# Patient Record
Sex: MALE | Race: WHITE | NOT HISPANIC OR LATINO
[De-identification: names, ages, dates, MRNs, and addresses within clinical notes are randomized per-mention and may not be internally consistent; named-entity substitution may affect disease eponyms.]

---

## 2019-05-24 PROBLEM — Z00.00 ENCOUNTER FOR PREVENTIVE HEALTH EXAMINATION: Status: ACTIVE | Noted: 2019-05-24

## 2019-05-28 ENCOUNTER — APPOINTMENT (OUTPATIENT)
Dept: PULMONOLOGY | Facility: CLINIC | Age: 64
End: 2019-05-28
Payer: COMMERCIAL

## 2019-05-28 VITALS
DIASTOLIC BLOOD PRESSURE: 60 MMHG | OXYGEN SATURATION: 89 % | HEART RATE: 95 BPM | HEIGHT: 67 IN | SYSTOLIC BLOOD PRESSURE: 130 MMHG | BODY MASS INDEX: 48.97 KG/M2 | WEIGHT: 312 LBS

## 2019-05-28 PROCEDURE — 99205 OFFICE O/P NEW HI 60 MIN: CPT | Mod: 25

## 2019-05-28 PROCEDURE — 94729 DIFFUSING CAPACITY: CPT

## 2019-05-28 PROCEDURE — 85018 HEMOGLOBIN: CPT | Mod: QW

## 2019-05-28 PROCEDURE — 94060 EVALUATION OF WHEEZING: CPT

## 2019-05-28 PROCEDURE — 94727 GAS DIL/WSHOT DETER LNG VOL: CPT

## 2019-05-28 PROCEDURE — 94664 DEMO&/EVAL PT USE INHALER: CPT | Mod: 59

## 2019-05-28 RX ORDER — ALBUTEROL SULFATE 90 UG/1
108 (90 BASE) AEROSOL, METERED RESPIRATORY (INHALATION)
Refills: 0 | Status: ACTIVE | COMMUNITY

## 2019-05-28 NOTE — HISTORY OF PRESENT ILLNESS
[Frequent Nocturnal Awakening] : frequent nocturnal awakening [Snoring] : snoring [Daytime Somnolence] : daytime somnolence [Awakes Unrefreshed] : awakening unrefreshed [FreeTextEntry1] : Since Dec frequent episodes of nasal congestion, cough, wheeze. Would resolve for a couple of weeks and come back. Then on 5/10 developed chest congestion, sob. Went to urgent care. CXR done. Dx with pna; given nebulizer which helped; Rx levaquin, ventolin. Using ventolin TID. \par \par Still with cough and mucous. Some LAGUNAS, tightness although better. \par \par Acute on chronic B/L LE edema as well. \par \par Dx with asthma as child. Never hospitalized. History of frequent bronchitis episodes. Often given prednisone, advair to use temporarily. \par \par Last year had echo and stress test with cardiologist in [place]. Nothing abnormal found.

## 2019-05-28 NOTE — PHYSICAL EXAM
[Normal Conjunctiva] : the conjunctiva exhibited no abnormalities [General Appearance - In No Acute Distress] : no acute distress [Normal Oropharynx] : abnormal oropharynx [Low Lying Soft Palate] : low lying soft palate [Elongated Uvula] : elongated uvula [Enlarged Base of the Tongue] : enlargement of the base of the tongue [III] : III [Neck Appearance] : the appearance of the neck was normal [] : the neck was supple [Neck Circumference: ___] : neck circumference is [unfilled] [Heart Rate And Rhythm] : heart rate and rhythm were normal [Heart Sounds] : normal S1 and S2 [Normal Rate] : the respiratory rate was normal [Rate ___] : at [unfilled] breaths per minute [Decreased Breath Sounds Bilaterally Bases] : breath sounds were diminished over both bases [Bowel Sounds] : normal bowel sounds [Abdomen Soft] : soft [FreeTextEntry1] : obese [Nail Clubbing] : no clubbing of the fingernails [Cyanosis, Localized] : no localized cyanosis [No Focal Deficits] : no focal deficits [Oriented To Time, Place, And Person] : oriented to person, place, and time [Affect] : the affect was normal [Impaired Insight] : insight and judgment were intact

## 2019-05-28 NOTE — PROCEDURE
[FreeTextEntry1] : cxr done at urgent center 5/10/19 shows indistinct heart border on left\par \par PFT: obstruction in the severe range FEV1 37%; no BD response; lung volumes normal; dlco normal\par \par exercise oxymetry: O2 saturation on RA with exertion dropped to 88%; with 2 LPM pulsed dose increased to 92%

## 2019-05-28 NOTE — ASSESSMENT
[FreeTextEntry1] : Severe obstructive lung dz likely asthma with remodeling. Changes on CXR that could represent infection. Morbid obesity. Hypoxia likely result of all this but given his obesity, LE edema, decreased activity due to acute illness, need to R/O PE. Likely GIOVANNI.

## 2019-06-27 ENCOUNTER — APPOINTMENT (OUTPATIENT)
Dept: PULMONOLOGY | Facility: CLINIC | Age: 64
End: 2019-06-27
Payer: COMMERCIAL

## 2019-06-27 VITALS — SYSTOLIC BLOOD PRESSURE: 130 MMHG | DIASTOLIC BLOOD PRESSURE: 80 MMHG

## 2019-06-27 VITALS — HEART RATE: 77 BPM | OXYGEN SATURATION: 92 %

## 2019-06-27 VITALS — WEIGHT: 299 LBS | BODY MASS INDEX: 46.83 KG/M2

## 2019-06-27 DIAGNOSIS — Z09 ENCOUNTER FOR FOLLOW-UP EXAMINATION AFTER COMPLETED TREATMENT FOR CONDITIONS OTHER THAN MALIGNANT NEOPLASM: ICD-10-CM

## 2019-06-27 PROCEDURE — 94010 BREATHING CAPACITY TEST: CPT

## 2019-06-27 PROCEDURE — 99215 OFFICE O/P EST HI 40 MIN: CPT | Mod: 25

## 2019-06-27 RX ORDER — PREDNISONE 10 MG/1
10 TABLET ORAL
Qty: 21 | Refills: 0 | Status: DISCONTINUED | COMMUNITY
Start: 2019-05-28 | End: 2019-06-27

## 2019-10-24 ENCOUNTER — APPOINTMENT (OUTPATIENT)
Dept: PULMONOLOGY | Facility: CLINIC | Age: 64
End: 2019-10-24

## 2020-06-11 ENCOUNTER — RX RENEWAL (OUTPATIENT)
Age: 65
End: 2020-06-11

## 2020-07-16 ENCOUNTER — RX CHANGE (OUTPATIENT)
Age: 65
End: 2020-07-16

## 2020-07-21 ENCOUNTER — RX RENEWAL (OUTPATIENT)
Age: 65
End: 2020-07-21

## 2020-08-24 ENCOUNTER — RX CHANGE (OUTPATIENT)
Age: 65
End: 2020-08-24

## 2020-08-26 ENCOUNTER — RX CHANGE (OUTPATIENT)
Age: 65
End: 2020-08-26

## 2020-10-27 DIAGNOSIS — Z23 ENCOUNTER FOR IMMUNIZATION: ICD-10-CM

## 2020-12-23 ENCOUNTER — APPOINTMENT (OUTPATIENT)
Dept: PULMONOLOGY | Facility: CLINIC | Age: 65
End: 2020-12-23
Payer: COMMERCIAL

## 2020-12-23 PROCEDURE — 99213 OFFICE O/P EST LOW 20 MIN: CPT | Mod: 95

## 2020-12-23 NOTE — HISTORY OF PRESENT ILLNESS
[Home] : at home, [unfilled] , at the time of the visit. [Medical Office: ([facility])___] : at the medical office located in  [TextBox_4] : 65M PMH mobid obesity, asthma, GIOVANNI who presents for [facility] telehealth visit for COVID-19. He tested positive on Sunday with a rapid swab. Symptoms include scratchy sore throat, which is almost resolved. No SOB. No chest pain. Oxygen ranges from 88-93. He has home oxygen prescribed by Dr. Olmstead, he has not used it yet. He has asthma and never diagnosed with COPD. No fevers. No chills. No N/V/D. No cigarette use. 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 Wife and son both have. No swelling in legs.

## 2022-03-20 ENCOUNTER — TRANSCRIPTION ENCOUNTER (OUTPATIENT)
Age: 67
End: 2022-03-20

## 2022-10-19 ENCOUNTER — APPOINTMENT (OUTPATIENT)
Dept: PULMONOLOGY | Facility: CLINIC | Age: 67
End: 2022-10-19

## 2022-10-19 VITALS
OXYGEN SATURATION: 94 % | RESPIRATION RATE: 16 BRPM | SYSTOLIC BLOOD PRESSURE: 122 MMHG | WEIGHT: 305 LBS | HEIGHT: 68 IN | BODY MASS INDEX: 46.23 KG/M2 | HEART RATE: 78 BPM | DIASTOLIC BLOOD PRESSURE: 78 MMHG

## 2022-10-19 DIAGNOSIS — R06.2 WHEEZING: ICD-10-CM

## 2022-10-19 DIAGNOSIS — R05.9 COUGH, UNSPECIFIED: ICD-10-CM

## 2022-10-19 DIAGNOSIS — U07.1 COVID-19: ICD-10-CM

## 2022-10-19 DIAGNOSIS — G47.33 OBSTRUCTIVE SLEEP APNEA (ADULT) (PEDIATRIC): ICD-10-CM

## 2022-10-19 PROCEDURE — 99214 OFFICE O/P EST MOD 30 MIN: CPT | Mod: CS

## 2022-10-19 RX ORDER — DEXAMETHASONE 6 MG/1
6 TABLET ORAL DAILY
Qty: 10 | Refills: 0 | Status: DISCONTINUED | COMMUNITY
Start: 2020-12-23 | End: 2022-10-19

## 2022-10-25 ENCOUNTER — OUTPATIENT (OUTPATIENT)
Dept: OUTPATIENT SERVICES | Facility: HOSPITAL | Age: 67
LOS: 1 days | End: 2022-10-25
Payer: COMMERCIAL

## 2022-10-25 DIAGNOSIS — G47.33 OBSTRUCTIVE SLEEP APNEA (ADULT) (PEDIATRIC): ICD-10-CM

## 2022-10-25 PROCEDURE — 95800 SLP STDY UNATTENDED: CPT

## 2023-02-16 ENCOUNTER — APPOINTMENT (OUTPATIENT)
Dept: PULMONOLOGY | Facility: CLINIC | Age: 68
End: 2023-02-16

## 2023-02-21 ENCOUNTER — NON-APPOINTMENT (OUTPATIENT)
Age: 68
End: 2023-02-21

## 2023-05-19 ENCOUNTER — EMERGENCY (EMERGENCY)
Facility: HOSPITAL | Age: 68
LOS: 1 days | Discharge: HOME | End: 2023-05-19
Attending: EMERGENCY MEDICINE
Payer: MEDICARE

## 2023-05-19 VITALS
HEIGHT: 68 IN | TEMPERATURE: 98 F | HEART RATE: 130 BPM | RESPIRATION RATE: 20 BRPM | OXYGEN SATURATION: 94 % | WEIGHT: 304.9 LBS | SYSTOLIC BLOOD PRESSURE: 121 MMHG | DIASTOLIC BLOOD PRESSURE: 70 MMHG

## 2023-05-19 VITALS — HEART RATE: 92 BPM

## 2023-05-19 PROCEDURE — 99283 EMERGENCY DEPT VISIT LOW MDM: CPT | Mod: 25

## 2023-05-19 PROCEDURE — 99284 EMERGENCY DEPT VISIT MOD MDM: CPT | Mod: FS,25

## 2023-05-19 PROCEDURE — 90715 TDAP VACCINE 7 YRS/> IM: CPT

## 2023-05-19 PROCEDURE — 12001 RPR S/N/AX/GEN/TRNK 2.5CM/<: CPT

## 2023-05-19 PROCEDURE — 90471 IMMUNIZATION ADMIN: CPT

## 2023-05-19 PROCEDURE — 73130 X-RAY EXAM OF HAND: CPT | Mod: 26,LT

## 2023-05-19 PROCEDURE — 73130 X-RAY EXAM OF HAND: CPT

## 2023-05-19 RX ORDER — TETANUS TOXOID, REDUCED DIPHTHERIA TOXOID AND ACELLULAR PERTUSSIS VACCINE, ADSORBED 5; 2.5; 8; 8; 2.5 [IU]/.5ML; [IU]/.5ML; UG/.5ML; UG/.5ML; UG/.5ML
0.5 SUSPENSION INTRAMUSCULAR ONCE
Refills: 0 | Status: COMPLETED | OUTPATIENT
Start: 2023-05-19 | End: 2023-05-19

## 2023-05-19 RX ORDER — LIDOCAINE HCL 20 MG/ML
5 VIAL (ML) INJECTION ONCE
Refills: 0 | Status: COMPLETED | OUTPATIENT
Start: 2023-05-19 | End: 2023-05-19

## 2023-05-19 RX ADMIN — TETANUS TOXOID, REDUCED DIPHTHERIA TOXOID AND ACELLULAR PERTUSSIS VACCINE, ADSORBED 0.5 MILLILITER(S): 5; 2.5; 8; 8; 2.5 SUSPENSION INTRAMUSCULAR at 14:13

## 2023-05-19 RX ADMIN — Medication 5 MILLILITER(S): at 14:55

## 2023-05-19 RX ADMIN — Medication 1 TABLET(S): at 14:13

## 2023-05-19 NOTE — ED PROVIDER NOTE - ATTENDING APP SHARED VISIT CONTRIBUTION OF CARE
Juancho THAKKAROD-25-ck[age]old male with history of melanoma excision from the left cheek recently presents with laceration of the left index finger and a crush injury of the nail when he was using the drill at home.  Patient is right-handed.  Last tetanus is unknown.  No other injuries    Patient is alert well-appearing male, S1-S2 is normal regular, bilateral clear breath sounds, abdomen is soft nontender, neuro exam alert oriented x3, no focal deficits, left index finger has a laceration on the nail and medial aspect of the finger on the dorsal aspect but finger pulp is intact patient has no tenderness of the DIP joint and is able to flex and extend the finger.  There was mild bleeding that is already controlled with pressure.    Plan to do an x-ray of the left hand to rule out any fracture will supplement tetanus and repair the skin portion of the finger and will leave the nail to grow by secondary intention and cover it with Augmentin with hand follow-up.

## 2023-05-19 NOTE — ED PROVIDER NOTE - OBJECTIVE STATEMENT
68M with pmh asthma presenting with left 2nd digit injury. Pt was using suwzall and it accidentally crushed the finger causing break in the nail.   Last tetanus vaccine unknown

## 2023-05-19 NOTE — ED PROVIDER NOTE - CARE PROVIDER_API CALL
Emery Ferrell)  Orthopaedic Surgery; Surgery of the Hand  [address] [place] [address]  Phone: [phone]  Fax: [phone]  Follow Up Time:

## 2023-05-19 NOTE — ED PROVIDER NOTE - PATIENT PORTAL LINK FT
You can access the FollowMyHealth Patient Portal offered by [facility] by registering at the following website: [email or web address]. By joining [employer]’s FollowMyHealth portal, you will also be able to view your health information using other applications (apps) compatible with our system.

## 2023-05-19 NOTE — ED PROVIDER NOTE - CLINICAL SUMMARY MEDICAL DECISION MAKING FREE TEXT BOX
68M with lac to left 2nd digit. lac to nail and dorsal finger tip. FROM, able to flex and extend finger.   XR,  lac repair, TDAP updated.   Hand FU needed. 68M with lac to left 2nd digit. lac to nail and dorsal finger tip. FROM, able to flex and extend finger.   XR,  lac repair, TDAP updated. XR with fx.   4 sutures placed to dorsal 2nd digit. Pt tolerated well. Discussed suture removal and splint in place. Hand FU. Discussed return precautions.

## 2023-05-19 NOTE — ED PROVIDER NOTE - NSFOLLOWUPINSTRUCTIONS_ED_ALL_ED_FT
Return in 7-10 days for suture removal.   Continue antibiotics until complete.   Follow up with PCP.   Return for any new or worsening symptoms.       Laceration    A laceration is a cut that goes through all of the layers of the skin and into the tissue that is right under the skin. Some lacerations heal on their own. Others need to be closed with skin adhesive strips, skin glue, stitches (sutures), or staples. Proper laceration care minimizes the risk of infection and helps the laceration to heal better.  If non-absorbable stitches or staples have been placed, they must be taken out within the time frame instructed by your healthcare provider.    SEEK IMMEDIATE MEDICAL CARE IF YOU HAVE ANY OF THE FOLLOWING SYMPTOMS: swelling around the wound, worsening pain, drainage from the wound, red streaking going away from your wound, inability to move finger or toe near the laceration, or discoloration of skin near the laceration.

## 2024-01-04 ENCOUNTER — NON-APPOINTMENT (OUTPATIENT)
Age: 69
End: 2024-01-04

## 2024-01-05 ENCOUNTER — INPATIENT (INPATIENT)
Facility: HOSPITAL | Age: 69
LOS: 3 days | Discharge: HOME | DRG: 308 | End: 2024-01-09
Attending: STUDENT IN AN ORGANIZED HEALTH CARE EDUCATION/TRAINING PROGRAM | Admitting: STUDENT IN AN ORGANIZED HEALTH CARE EDUCATION/TRAINING PROGRAM
Payer: COMMERCIAL

## 2024-01-05 VITALS
TEMPERATURE: 97 F | HEART RATE: 158 BPM | OXYGEN SATURATION: 97 % | SYSTOLIC BLOOD PRESSURE: 149 MMHG | DIASTOLIC BLOOD PRESSURE: 92 MMHG | RESPIRATION RATE: 20 BRPM

## 2024-01-05 DIAGNOSIS — I48.91 UNSPECIFIED ATRIAL FIBRILLATION: ICD-10-CM

## 2024-01-05 LAB
ALBUMIN SERPL ELPH-MCNC: 4 G/DL — SIGNIFICANT CHANGE UP (ref 3.3–5.2)
ALBUMIN SERPL ELPH-MCNC: 4 G/DL — SIGNIFICANT CHANGE UP (ref 3.3–5.2)
ALP SERPL-CCNC: 69 U/L — SIGNIFICANT CHANGE UP (ref 40–120)
ALP SERPL-CCNC: 69 U/L — SIGNIFICANT CHANGE UP (ref 40–120)
ALT FLD-CCNC: 47 U/L — HIGH
ALT FLD-CCNC: 47 U/L — HIGH
ANION GAP SERPL CALC-SCNC: 13 MMOL/L — SIGNIFICANT CHANGE UP (ref 5–17)
ANION GAP SERPL CALC-SCNC: 13 MMOL/L — SIGNIFICANT CHANGE UP (ref 5–17)
APTT BLD: 32.9 SEC — SIGNIFICANT CHANGE UP (ref 24.5–35.6)
APTT BLD: 32.9 SEC — SIGNIFICANT CHANGE UP (ref 24.5–35.6)
AST SERPL-CCNC: 36 U/L — SIGNIFICANT CHANGE UP
AST SERPL-CCNC: 36 U/L — SIGNIFICANT CHANGE UP
BASOPHILS # BLD AUTO: 0.02 K/UL — SIGNIFICANT CHANGE UP (ref 0–0.2)
BASOPHILS # BLD AUTO: 0.02 K/UL — SIGNIFICANT CHANGE UP (ref 0–0.2)
BASOPHILS NFR BLD AUTO: 0.3 % — SIGNIFICANT CHANGE UP (ref 0–2)
BASOPHILS NFR BLD AUTO: 0.3 % — SIGNIFICANT CHANGE UP (ref 0–2)
BILIRUB SERPL-MCNC: 1.3 MG/DL — SIGNIFICANT CHANGE UP (ref 0.4–2)
BILIRUB SERPL-MCNC: 1.3 MG/DL — SIGNIFICANT CHANGE UP (ref 0.4–2)
BUN SERPL-MCNC: 10.6 MG/DL — SIGNIFICANT CHANGE UP (ref 8–20)
BUN SERPL-MCNC: 10.6 MG/DL — SIGNIFICANT CHANGE UP (ref 8–20)
CALCIUM SERPL-MCNC: 9.3 MG/DL — SIGNIFICANT CHANGE UP (ref 8.4–10.5)
CALCIUM SERPL-MCNC: 9.3 MG/DL — SIGNIFICANT CHANGE UP (ref 8.4–10.5)
CHLORIDE SERPL-SCNC: 101 MMOL/L — SIGNIFICANT CHANGE UP (ref 96–108)
CHLORIDE SERPL-SCNC: 101 MMOL/L — SIGNIFICANT CHANGE UP (ref 96–108)
CO2 SERPL-SCNC: 26 MMOL/L — SIGNIFICANT CHANGE UP (ref 22–29)
CO2 SERPL-SCNC: 26 MMOL/L — SIGNIFICANT CHANGE UP (ref 22–29)
CREAT SERPL-MCNC: 0.82 MG/DL — SIGNIFICANT CHANGE UP (ref 0.5–1.3)
CREAT SERPL-MCNC: 0.82 MG/DL — SIGNIFICANT CHANGE UP (ref 0.5–1.3)
D DIMER BLD IA.RAPID-MCNC: 397 NG/ML DDU — HIGH
D DIMER BLD IA.RAPID-MCNC: 397 NG/ML DDU — HIGH
EGFR: 97 ML/MIN/1.73M2 — SIGNIFICANT CHANGE UP
EGFR: 97 ML/MIN/1.73M2 — SIGNIFICANT CHANGE UP
EOSINOPHIL # BLD AUTO: 0.07 K/UL — SIGNIFICANT CHANGE UP (ref 0–0.5)
EOSINOPHIL # BLD AUTO: 0.07 K/UL — SIGNIFICANT CHANGE UP (ref 0–0.5)
EOSINOPHIL NFR BLD AUTO: 0.9 % — SIGNIFICANT CHANGE UP (ref 0–6)
EOSINOPHIL NFR BLD AUTO: 0.9 % — SIGNIFICANT CHANGE UP (ref 0–6)
GLUCOSE SERPL-MCNC: 101 MG/DL — HIGH (ref 70–99)
GLUCOSE SERPL-MCNC: 101 MG/DL — HIGH (ref 70–99)
HCT VFR BLD CALC: 43.3 % — SIGNIFICANT CHANGE UP (ref 39–50)
HCT VFR BLD CALC: 43.3 % — SIGNIFICANT CHANGE UP (ref 39–50)
HGB BLD-MCNC: 14.6 G/DL — SIGNIFICANT CHANGE UP (ref 13–17)
HGB BLD-MCNC: 14.6 G/DL — SIGNIFICANT CHANGE UP (ref 13–17)
IMM GRANULOCYTES NFR BLD AUTO: 0.3 % — SIGNIFICANT CHANGE UP (ref 0–0.9)
IMM GRANULOCYTES NFR BLD AUTO: 0.3 % — SIGNIFICANT CHANGE UP (ref 0–0.9)
INR BLD: 1.1 RATIO — SIGNIFICANT CHANGE UP (ref 0.85–1.18)
INR BLD: 1.1 RATIO — SIGNIFICANT CHANGE UP (ref 0.85–1.18)
LYMPHOCYTES # BLD AUTO: 1.74 K/UL — SIGNIFICANT CHANGE UP (ref 1–3.3)
LYMPHOCYTES # BLD AUTO: 1.74 K/UL — SIGNIFICANT CHANGE UP (ref 1–3.3)
LYMPHOCYTES # BLD AUTO: 23.1 % — SIGNIFICANT CHANGE UP (ref 13–44)
LYMPHOCYTES # BLD AUTO: 23.1 % — SIGNIFICANT CHANGE UP (ref 13–44)
MAGNESIUM SERPL-MCNC: 2.1 MG/DL — SIGNIFICANT CHANGE UP (ref 1.6–2.6)
MAGNESIUM SERPL-MCNC: 2.1 MG/DL — SIGNIFICANT CHANGE UP (ref 1.6–2.6)
MCHC RBC-ENTMCNC: 32.3 PG — SIGNIFICANT CHANGE UP (ref 27–34)
MCHC RBC-ENTMCNC: 32.3 PG — SIGNIFICANT CHANGE UP (ref 27–34)
MCHC RBC-ENTMCNC: 33.7 GM/DL — SIGNIFICANT CHANGE UP (ref 32–36)
MCHC RBC-ENTMCNC: 33.7 GM/DL — SIGNIFICANT CHANGE UP (ref 32–36)
MCV RBC AUTO: 95.8 FL — SIGNIFICANT CHANGE UP (ref 80–100)
MCV RBC AUTO: 95.8 FL — SIGNIFICANT CHANGE UP (ref 80–100)
MONOCYTES # BLD AUTO: 0.42 K/UL — SIGNIFICANT CHANGE UP (ref 0–0.9)
MONOCYTES # BLD AUTO: 0.42 K/UL — SIGNIFICANT CHANGE UP (ref 0–0.9)
MONOCYTES NFR BLD AUTO: 5.6 % — SIGNIFICANT CHANGE UP (ref 2–14)
MONOCYTES NFR BLD AUTO: 5.6 % — SIGNIFICANT CHANGE UP (ref 2–14)
NEUTROPHILS # BLD AUTO: 5.26 K/UL — SIGNIFICANT CHANGE UP (ref 1.8–7.4)
NEUTROPHILS # BLD AUTO: 5.26 K/UL — SIGNIFICANT CHANGE UP (ref 1.8–7.4)
NEUTROPHILS NFR BLD AUTO: 69.8 % — SIGNIFICANT CHANGE UP (ref 43–77)
NEUTROPHILS NFR BLD AUTO: 69.8 % — SIGNIFICANT CHANGE UP (ref 43–77)
NT-PROBNP SERPL-SCNC: 1881 PG/ML — HIGH (ref 0–300)
NT-PROBNP SERPL-SCNC: 1881 PG/ML — HIGH (ref 0–300)
PLATELET # BLD AUTO: 289 K/UL — SIGNIFICANT CHANGE UP (ref 150–400)
PLATELET # BLD AUTO: 289 K/UL — SIGNIFICANT CHANGE UP (ref 150–400)
POTASSIUM SERPL-MCNC: 4.1 MMOL/L — SIGNIFICANT CHANGE UP (ref 3.5–5.3)
POTASSIUM SERPL-MCNC: 4.1 MMOL/L — SIGNIFICANT CHANGE UP (ref 3.5–5.3)
POTASSIUM SERPL-SCNC: 4.1 MMOL/L — SIGNIFICANT CHANGE UP (ref 3.5–5.3)
POTASSIUM SERPL-SCNC: 4.1 MMOL/L — SIGNIFICANT CHANGE UP (ref 3.5–5.3)
PROT SERPL-MCNC: 7.3 G/DL — SIGNIFICANT CHANGE UP (ref 6.6–8.7)
PROT SERPL-MCNC: 7.3 G/DL — SIGNIFICANT CHANGE UP (ref 6.6–8.7)
PROTHROM AB SERPL-ACNC: 12.2 SEC — SIGNIFICANT CHANGE UP (ref 9.5–13)
PROTHROM AB SERPL-ACNC: 12.2 SEC — SIGNIFICANT CHANGE UP (ref 9.5–13)
RBC # BLD: 4.52 M/UL — SIGNIFICANT CHANGE UP (ref 4.2–5.8)
RBC # BLD: 4.52 M/UL — SIGNIFICANT CHANGE UP (ref 4.2–5.8)
RBC # FLD: 13.3 % — SIGNIFICANT CHANGE UP (ref 10.3–14.5)
RBC # FLD: 13.3 % — SIGNIFICANT CHANGE UP (ref 10.3–14.5)
SODIUM SERPL-SCNC: 140 MMOL/L — SIGNIFICANT CHANGE UP (ref 135–145)
SODIUM SERPL-SCNC: 140 MMOL/L — SIGNIFICANT CHANGE UP (ref 135–145)
TROPONIN T, HIGH SENSITIVITY RESULT: 29 NG/L — SIGNIFICANT CHANGE UP (ref 0–51)
TROPONIN T, HIGH SENSITIVITY RESULT: 29 NG/L — SIGNIFICANT CHANGE UP (ref 0–51)
WBC # BLD: 7.53 K/UL — SIGNIFICANT CHANGE UP (ref 3.8–10.5)
WBC # BLD: 7.53 K/UL — SIGNIFICANT CHANGE UP (ref 3.8–10.5)
WBC # FLD AUTO: 7.53 K/UL — SIGNIFICANT CHANGE UP (ref 3.8–10.5)
WBC # FLD AUTO: 7.53 K/UL — SIGNIFICANT CHANGE UP (ref 3.8–10.5)

## 2024-01-05 PROCEDURE — 71045 X-RAY EXAM CHEST 1 VIEW: CPT | Mod: 26

## 2024-01-05 PROCEDURE — 99285 EMERGENCY DEPT VISIT HI MDM: CPT | Mod: GC

## 2024-01-05 PROCEDURE — 99222 1ST HOSP IP/OBS MODERATE 55: CPT

## 2024-01-05 PROCEDURE — 93010 ELECTROCARDIOGRAM REPORT: CPT | Mod: 76

## 2024-01-05 RX ORDER — AZITHROMYCIN 500 MG/1
TABLET, FILM COATED ORAL
Refills: 0 | Status: DISCONTINUED | OUTPATIENT
Start: 2024-01-05 | End: 2024-01-09

## 2024-01-05 RX ORDER — LEVALBUTEROL 1.25 MG/.5ML
0.63 SOLUTION, CONCENTRATE RESPIRATORY (INHALATION) ONCE
Refills: 0 | Status: COMPLETED | OUTPATIENT
Start: 2024-01-05 | End: 2024-01-05

## 2024-01-05 RX ORDER — DILTIAZEM HCL 120 MG
10 CAPSULE, EXT RELEASE 24 HR ORAL ONCE
Refills: 0 | Status: COMPLETED | OUTPATIENT
Start: 2024-01-05 | End: 2024-01-05

## 2024-01-05 RX ORDER — HEPARIN SODIUM 5000 [USP'U]/ML
5000 INJECTION INTRAVENOUS; SUBCUTANEOUS EVERY 6 HOURS
Refills: 0 | Status: DISCONTINUED | OUTPATIENT
Start: 2024-01-05 | End: 2024-01-08

## 2024-01-05 RX ORDER — DILTIAZEM HCL 120 MG
30 CAPSULE, EXT RELEASE 24 HR ORAL EVERY 6 HOURS
Refills: 0 | Status: DISCONTINUED | OUTPATIENT
Start: 2024-01-05 | End: 2024-01-05

## 2024-01-05 RX ORDER — LANOLIN ALCOHOL/MO/W.PET/CERES
3 CREAM (GRAM) TOPICAL AT BEDTIME
Refills: 0 | Status: DISCONTINUED | OUTPATIENT
Start: 2024-01-05 | End: 2024-01-09

## 2024-01-05 RX ORDER — ADENOSINE 3 MG/ML
12 INJECTION INTRAVENOUS ONCE
Refills: 0 | Status: COMPLETED | OUTPATIENT
Start: 2024-01-05 | End: 2024-01-05

## 2024-01-05 RX ORDER — HEPARIN SODIUM 5000 [USP'U]/ML
INJECTION INTRAVENOUS; SUBCUTANEOUS
Qty: 25000 | Refills: 0 | Status: DISCONTINUED | OUTPATIENT
Start: 2024-01-05 | End: 2024-01-08

## 2024-01-05 RX ORDER — ONDANSETRON 8 MG/1
4 TABLET, FILM COATED ORAL EVERY 8 HOURS
Refills: 0 | Status: DISCONTINUED | OUTPATIENT
Start: 2024-01-05 | End: 2024-01-09

## 2024-01-05 RX ORDER — DILTIAZEM HCL 120 MG
10 CAPSULE, EXT RELEASE 24 HR ORAL
Refills: 0 | Status: DISCONTINUED | OUTPATIENT
Start: 2024-01-05 | End: 2024-01-05

## 2024-01-05 RX ORDER — SODIUM CHLORIDE 9 MG/ML
1000 INJECTION INTRAMUSCULAR; INTRAVENOUS; SUBCUTANEOUS ONCE
Refills: 0 | Status: COMPLETED | OUTPATIENT
Start: 2024-01-05 | End: 2024-01-05

## 2024-01-05 RX ORDER — ADENOSINE 3 MG/ML
6 INJECTION INTRAVENOUS ONCE
Refills: 0 | Status: COMPLETED | OUTPATIENT
Start: 2024-01-05 | End: 2024-01-05

## 2024-01-05 RX ORDER — AZITHROMYCIN 500 MG/1
500 TABLET, FILM COATED ORAL ONCE
Refills: 0 | Status: COMPLETED | OUTPATIENT
Start: 2024-01-05 | End: 2024-01-05

## 2024-01-05 RX ORDER — DILTIAZEM HCL 120 MG
20 CAPSULE, EXT RELEASE 24 HR ORAL
Refills: 0 | Status: DISCONTINUED | OUTPATIENT
Start: 2024-01-05 | End: 2024-01-06

## 2024-01-05 RX ORDER — ACETAMINOPHEN 500 MG
650 TABLET ORAL EVERY 6 HOURS
Refills: 0 | Status: DISCONTINUED | OUTPATIENT
Start: 2024-01-05 | End: 2024-01-09

## 2024-01-05 RX ORDER — CEFTRIAXONE 500 MG/1
1000 INJECTION, POWDER, FOR SOLUTION INTRAMUSCULAR; INTRAVENOUS ONCE
Refills: 0 | Status: DISCONTINUED | OUTPATIENT
Start: 2024-01-05 | End: 2024-01-05

## 2024-01-05 RX ORDER — DILTIAZEM HCL 120 MG
60 CAPSULE, EXT RELEASE 24 HR ORAL EVERY 6 HOURS
Refills: 0 | Status: DISCONTINUED | OUTPATIENT
Start: 2024-01-05 | End: 2024-01-06

## 2024-01-05 RX ORDER — HEPARIN SODIUM 5000 [USP'U]/ML
10000 INJECTION INTRAVENOUS; SUBCUTANEOUS EVERY 6 HOURS
Refills: 0 | Status: DISCONTINUED | OUTPATIENT
Start: 2024-01-05 | End: 2024-01-08

## 2024-01-05 RX ORDER — HEPARIN SODIUM 5000 [USP'U]/ML
10000 INJECTION INTRAVENOUS; SUBCUTANEOUS ONCE
Refills: 0 | Status: COMPLETED | OUTPATIENT
Start: 2024-01-05 | End: 2024-01-05

## 2024-01-05 RX ORDER — AZITHROMYCIN 500 MG/1
500 TABLET, FILM COATED ORAL EVERY 24 HOURS
Refills: 0 | Status: DISCONTINUED | OUTPATIENT
Start: 2024-01-06 | End: 2024-01-09

## 2024-01-05 RX ORDER — DILTIAZEM HCL 120 MG
60 CAPSULE, EXT RELEASE 24 HR ORAL ONCE
Refills: 0 | Status: DISCONTINUED | OUTPATIENT
Start: 2024-01-05 | End: 2024-01-05

## 2024-01-05 RX ORDER — CEFTRIAXONE 500 MG/1
1000 INJECTION, POWDER, FOR SOLUTION INTRAMUSCULAR; INTRAVENOUS ONCE
Refills: 0 | Status: COMPLETED | OUTPATIENT
Start: 2024-01-05 | End: 2024-01-05

## 2024-01-05 RX ORDER — SODIUM CHLORIDE 9 MG/ML
3 INJECTION INTRAMUSCULAR; INTRAVENOUS; SUBCUTANEOUS EVERY 8 HOURS
Refills: 0 | Status: DISCONTINUED | OUTPATIENT
Start: 2024-01-05 | End: 2024-01-09

## 2024-01-05 RX ADMIN — CEFTRIAXONE 1000 MILLIGRAM(S): 500 INJECTION, POWDER, FOR SOLUTION INTRAMUSCULAR; INTRAVENOUS at 18:58

## 2024-01-05 RX ADMIN — ADENOSINE 12 MILLIGRAM(S): 3 INJECTION INTRAVENOUS at 17:45

## 2024-01-05 RX ADMIN — HEPARIN SODIUM 2400 UNIT(S)/HR: 5000 INJECTION INTRAVENOUS; SUBCUTANEOUS at 20:31

## 2024-01-05 RX ADMIN — Medication 10 MILLIGRAM(S): at 17:44

## 2024-01-05 RX ADMIN — Medication 10 MILLIGRAM(S): at 22:26

## 2024-01-05 RX ADMIN — ADENOSINE 6 MILLIGRAM(S): 3 INJECTION INTRAVENOUS at 17:44

## 2024-01-05 RX ADMIN — Medication 125 MILLIGRAM(S): at 17:47

## 2024-01-05 RX ADMIN — SODIUM CHLORIDE 3 MILLILITER(S): 9 INJECTION INTRAMUSCULAR; INTRAVENOUS; SUBCUTANEOUS at 23:18

## 2024-01-05 RX ADMIN — LEVALBUTEROL 0.63 MILLIGRAM(S): 1.25 SOLUTION, CONCENTRATE RESPIRATORY (INHALATION) at 17:27

## 2024-01-05 RX ADMIN — AZITHROMYCIN 255 MILLIGRAM(S): 500 TABLET, FILM COATED ORAL at 18:58

## 2024-01-05 RX ADMIN — SODIUM CHLORIDE 1000 MILLILITER(S): 9 INJECTION INTRAMUSCULAR; INTRAVENOUS; SUBCUTANEOUS at 17:39

## 2024-01-05 RX ADMIN — Medication 10 MILLIGRAM(S): at 20:42

## 2024-01-05 RX ADMIN — Medication 30 MILLIGRAM(S): at 17:58

## 2024-01-05 RX ADMIN — HEPARIN SODIUM 10000 UNIT(S): 5000 INJECTION INTRAVENOUS; SUBCUTANEOUS at 20:31

## 2024-01-05 NOTE — ED PROVIDER NOTE - NS ED ROS FT
General: No fever, chills.  Respiratory: +cough, SOB  Cardiac: No chest pain  GI: No abdominal pain, nausea, vomiting  Neuro: No headache  MSK: No muscle pain, joint pain, back pain.  Psych: No known mental health issues.  Endocrine: No heat/cold intolerance, no polyuria/polydipsia.  Heme: No easy bruising or bleeding.  Allergic: No pruritis, dermatitis, or environmental allergies.

## 2024-01-05 NOTE — ED ADULT NURSE REASSESSMENT NOTE - NS ED NURSE REASSESS COMMENT FT1
Patient resting comfortably on the stretcher. Family at bedside. Cardiac monitor Atrial flutter cardizem IV push given as ordered. Patient staing 95% on 2L/N/C, RR even and unlabored. Heparin drip started as ordered. Family at bedside. No acute distress noted.

## 2024-01-05 NOTE — ED PROVIDER NOTE - ATTENDING CONTRIBUTION TO CARE
67yo M with PMHx of asthma who presents to the ED complaining of shortness of breath and palpitations.pt with noted in atrial tachycardia;   pt treated for aflutter/afib;  cardiology consult ;  ? ac 69yo M with PMHx of asthma who presents to the ED complaining of shortness of breath and palpitations.pt with noted in atrial tachycardia;   pt treated for aflutter/afib;  cardiology consult ;  ? ac

## 2024-01-05 NOTE — CONSULT NOTE ADULT - SUBJECTIVE AND OBJECTIVE BOX
[place] [facility], THE HEART CENTER                                   [address] [place] [address]                                                      PHONE: [phone]                                                         FAX: [phone]  [email or web address]  ---------------------------------------------------------------------------------------------------------------------------------    HPI:  YAMIL ANNE is an 68y Male PMHX of SVT who presented to [facility] ED with palpitations.  Pt has had URI symptoms for the past 6 weeks.  Overnight pt with worsening of his  palpitations and feeling of brain fog.   IN ED pt found to be in SVT.  Pt reported as receiving adenosine 6mg with no effect, then 12mg with slowing of heart rate revealing atrial flutter waves. Pt given an additional 10mg diltiazem IV with improvement of symptoms and rate control, HR down to 70s.     PAST MEDICAL & SURGICAL HISTORY:  Asthma          aspirin (Short breath)      MEDICATIONS  (STANDING):  azithromycin  IVPB      cefTRIAXone   IVPB 1000 milliGRAM(s) IV Intermittent once  diltiazem    Tablet 30 milliGRAM(s) Oral every 6 hours    MEDICATIONS  (PRN):      Family History: Pt denies hx of early cad, SCD, or congenital heart disease.      Social History:  Cigarettes:   former                 Alchohol:   no              Illicit Drug Abuse:  no    ROS:    Extensively Reviewed with pertinents as per HPI the remainder were negative.      Vital Signs Last 24 Hrs  T(C): 36.3 (05 Jan 2024 16:47), Max: 36.3 (05 Jan 2024 16:47)  T(F): 97.3 (05 Jan 2024 16:47), Max: 97.3 (05 Jan 2024 16:47)  HR: 79 (05 Jan 2024 17:54) (77 - 158)  BP: 123/62 (05 Jan 2024 17:54) (99/62 - 162/116)  BP(mean): --  RR: 20 (05 Jan 2024 17:54) (20 - 20)  SpO2: 96% (05 Jan 2024 17:55) (92% - 100%)    Parameters below as of 05 Jan 2024 17:55  Patient On (Oxygen Delivery Method): nasal cannula  O2 Flow (L/min): 2    ICU Vital Signs Last 24 Hrs  YAMIL ANNE  I&O's Detail    I&O's Summary    Drug Dosing Weight  YAMIL ANNE      PHYSICAL EXAM:  General: Appears well developed, well nourished alert and cooperative.  HEENT: Head; normocephalic, atraumatic.  Eyes: Pupils reactive, cornea wnl.  Neck: Supple, no nodes adenopathy, no NVD or carotid bruit or thyromegaly.  CARDIOVASCULAR: irreg irreg   LUNGS: No rales, rhonchi or wheeze. Normal breath sounds bilaterally.  ABDOMEN: Soft, nontender without mass or organomegaly. bowel sounds normoactive.  EXTREMITIES: No clubbing, cyanosis or edema. Distal pulses wnl.   SKIN: warm and dry with normal turgor.     LABS:  pending         YAMIL ANNE            RADIOLOGY & ADDITIONAL STUDIES:    INTERPRETATION OF TELEMETRY (personally reviewed):  labs- pending   ECG:pending      Assessment and Plan:  In summary,YAMIL ANNE is an 68y Male PMHX of SVT who presented to [facility] ED with palpitations.  Pt has had URI symptoms for the past 6 weeks.  Overnight pt with worsening of his  palpitations and feeling of brain fog.   IN ED pt found to be in SVT.  Pt reported as receiving adenosine 6mg with no effect, then 12mg with slowing of heart rate revealing atrial flutter waves. Pt given an additional 10mg diltiazem IV with improvement of symptoms and rate control, HR down to 70s.     AFib/AFL  -rate controlled on cardizem po/iv prn  -heparin gtt for AC for now  -echo   -will have EP Eval in AM     Thank you for allowing [facility] to participate in the care of this patient.  Please feel free to call with any questions or concerns.                  [place] [facility], THE HEART CENTER                                   [address] [place] [address]                                                      PHONE: [phone]                                                         FAX: [phone]  [email or web address]  ---------------------------------------------------------------------------------------------------------------------------------    HPI:  YAMIL ANNE is an 68y Male PMHX of SVT who presented to [facility] ED with palpitations.  Pt has had URI symptoms for the past 6 weeks.  Overnight pt with worsening of his  palpitations and feeling of brain fog.   IN ED pt found to be in SVT.  Pt reported as receiving adenosine 6mg with no effect, then 12mg with slowing of heart rate revealing atrial flutter waves. Pt given an additional 10mg diltiazem IV with improvement of symptoms and rate control, HR down to 70s.     PAST MEDICAL & SURGICAL HISTORY:  Asthma          aspirin (Short breath)      MEDICATIONS  (STANDING):  azithromycin  IVPB      cefTRIAXone   IVPB 1000 milliGRAM(s) IV Intermittent once  diltiazem    Tablet 30 milliGRAM(s) Oral every 6 hours    MEDICATIONS  (PRN):      Family History: Pt denies hx of early cad, SCD, or congenital heart disease.      Social History:  Cigarettes:   former                 Alchohol:   no              Illicit Drug Abuse:  no    ROS:    Extensively Reviewed with pertinents as per HPI the remainder were negative.      Vital Signs Last 24 Hrs  T(C): 36.3 (05 Jan 2024 16:47), Max: 36.3 (05 Jan 2024 16:47)  T(F): 97.3 (05 Jan 2024 16:47), Max: 97.3 (05 Jan 2024 16:47)  HR: 79 (05 Jan 2024 17:54) (77 - 158)  BP: 123/62 (05 Jan 2024 17:54) (99/62 - 162/116)  BP(mean): --  RR: 20 (05 Jan 2024 17:54) (20 - 20)  SpO2: 96% (05 Jan 2024 17:55) (92% - 100%)    Parameters below as of 05 Jan 2024 17:55  Patient On (Oxygen Delivery Method): nasal cannula  O2 Flow (L/min): 2    ICU Vital Signs Last 24 Hrs  YAMIL ANNE  I&O's Detail    I&O's Summary    Drug Dosing Weight  YAMIL ANNE      PHYSICAL EXAM:  General: Appears well developed, well nourished alert and cooperative.  HEENT: Head; normocephalic, atraumatic.  Eyes: Pupils reactive, cornea wnl.  Neck: Supple, no nodes adenopathy, no NVD or carotid bruit or thyromegaly.  CARDIOVASCULAR: irreg irreg   LUNGS: No rales, rhonchi or wheeze. Normal breath sounds bilaterally.  ABDOMEN: Soft, nontender without mass or organomegaly. bowel sounds normoactive.  EXTREMITIES: No clubbing, cyanosis or edema. Distal pulses wnl.   SKIN: warm and dry with normal turgor.     LABS:  pending         YAMIL ANNE            RADIOLOGY & ADDITIONAL STUDIES:    INTERPRETATION OF TELEMETRY (personally reviewed):  labs- pending   ECG:pending      Assessment and Plan:  In summary,YAMIL ANNE is an 68y Male PMHX of SVT who presented to [facility] ED with palpitations.  Pt has had URI symptoms for the past 6 weeks.  Overnight pt with worsening of his  palpitations and feeling of brain fog.   IN ED pt found to be in SVT.  Pt reported as receiving adenosine 6mg with no effect, then 12mg with slowing of heart rate revealing atrial flutter waves. Pt given an additional 10mg diltiazem IV with improvement of symptoms and rate control, HR down to 70s.     AFib/AFL  -rate controlled on cardizem po/iv prn  -heparin gtt for AC for now  -echo   -will have EP Eval in AM     Thank you for allowing [facility] to participate in the care of this patient.  Please feel free to call with any questions or concerns.                  [place] [facility]ASC[facility], THE HEART CENTER                                   [address] [place] [address]                                                      PHONE: [phone]                                                         FAX: [phone]  [email or web address]  ---------------------------------------------------------------------------------------------------------------------------------    HPI:  YAMIL ANNE is an 68y Male PMHX of SVT who presented to [facility] ED with palpitations.  Pt has had URI symptoms for the past 6 weeks.  Overnight pt with worsening of his  palpitations and feeling of brain fog.   IN ED pt found to be in SVT.  Pt reported as receiving adenosine 6mg with no effect, then 12mg with slowing of heart rate revealing atrial flutter waves. Pt given an additional 10mg diltiazem IV with improvement of symptoms and rate control, HR down to 70s.     PAST MEDICAL & SURGICAL HISTORY:  Asthma          aspirin (Short breath)      MEDICATIONS  (STANDING):  azithromycin  IVPB      cefTRIAXone   IVPB 1000 milliGRAM(s) IV Intermittent once  diltiazem    Tablet 30 milliGRAM(s) Oral every 6 hours    MEDICATIONS  (PRN):      Family History: Pt denies hx of early cad, SCD, or congenital heart disease.      Social History:  Cigarettes:   former                 Alchohol:   no              Illicit Drug Abuse:  no    ROS:    Extensively Reviewed with pertinents as per HPI the remainder were negative.      Vital Signs Last 24 Hrs  T(C): 36.3 (05 Jan 2024 16:47), Max: 36.3 (05 Jan 2024 16:47)  T(F): 97.3 (05 Jan 2024 16:47), Max: 97.3 (05 Jan 2024 16:47)  HR: 79 (05 Jan 2024 17:54) (77 - 158)  BP: 123/62 (05 Jan 2024 17:54) (99/62 - 162/116)  BP(mean): --  RR: 20 (05 Jan 2024 17:54) (20 - 20)  SpO2: 96% (05 Jan 2024 17:55) (92% - 100%)    Parameters below as of 05 Jan 2024 17:55  Patient On (Oxygen Delivery Method): nasal cannula  O2 Flow (L/min): 2    ICU Vital Signs Last 24 Hrs  YAMIL ANNE  I&O's Detail    I&O's Summary    Drug Dosing Weight  YAMIL ANNE      PHYSICAL EXAM:  General: Appears well developed, well nourished alert and cooperative.  HEENT: Head; normocephalic, atraumatic.  Eyes: Pupils reactive, cornea wnl.  Neck: Supple, no nodes adenopathy, no NVD or carotid bruit or thyromegaly.  CARDIOVASCULAR: irreg irreg   LUNGS: No rales, rhonchi or wheeze. Normal breath sounds bilaterally.  ABDOMEN: Soft, nontender without mass or organomegaly. bowel sounds normoactive.  EXTREMITIES: No clubbing, cyanosis or edema. Distal pulses wnl.   SKIN: warm and dry with normal turgor.     LABS:  pending         YAMIL ANNE            RADIOLOGY & ADDITIONAL STUDIES:    INTERPRETATION OF TELEMETRY (personally reviewed):  labs- pending   ECGAFib/AFL      Assessment and Plan:  In summary,YAMIL ANNE is an 68y Male PMHX of SVT who presented to [facility] ED with palpitations.  Pt has had URI symptoms for the past 6 weeks.  Overnight pt with worsening of his  palpitations and feeling of brain fog.   IN ED pt found to be in SVT.  Pt reported as receiving adenosine 6mg with no effect, then 12mg with slowing of heart rate revealing atrial flutter waves. Pt given an additional 10mg diltiazem IV with improvement of symptoms and rate control, HR down to 70s.     AFib/AFL  -rate controlled on cardizem po/iv prn  -heparin gtt for AC for now  -echo   -will have EP Eval in AM   -Pulm Eval - Dr Olmstead his outpt pulm   -possible LAN/DCCV on Monday     PNA  -abx as per primary team    Thank you for allowing [facility] to participate in the care of this patient.  Please feel free to call with any questions or concerns.                  [place] [facility], THE HEART CENTER                                   [address] [place] [address]                                                      PHONE: [phone]                                                         FAX: [phone]  [email or web address]  ---------------------------------------------------------------------------------------------------------------------------------    HPI:  YAMIL ANNE is an 68y Male PMHX of SVT who presented to [facility] ED with palpitations.  Pt has had URI symptoms for the past 6 weeks.  Overnight pt with worsening of his  palpitations and feeling of brain fog.   IN ED pt found to be in SVT.  Pt reported as receiving adenosine 6mg with no effect, then 12mg with slowing of heart rate revealing atrial flutter waves. Pt given an additional 10mg diltiazem IV with improvement of symptoms and rate control, HR down to 70s.     PAST MEDICAL & SURGICAL HISTORY:  Asthma          aspirin (Short breath)      MEDICATIONS  (STANDING):  azithromycin  IVPB      cefTRIAXone   IVPB 1000 milliGRAM(s) IV Intermittent once  diltiazem    Tablet 30 milliGRAM(s) Oral every 6 hours    MEDICATIONS  (PRN):      Family History: Pt denies hx of early cad, SCD, or congenital heart disease.      Social History:  Cigarettes:   former                 Alchohol:   no              Illicit Drug Abuse:  no    ROS:    Extensively Reviewed with pertinents as per HPI the remainder were negative.      Vital Signs Last 24 Hrs  T(C): 36.3 (05 Jan 2024 16:47), Max: 36.3 (05 Jan 2024 16:47)  T(F): 97.3 (05 Jan 2024 16:47), Max: 97.3 (05 Jan 2024 16:47)  HR: 79 (05 Jan 2024 17:54) (77 - 158)  BP: 123/62 (05 Jan 2024 17:54) (99/62 - 162/116)  BP(mean): --  RR: 20 (05 Jan 2024 17:54) (20 - 20)  SpO2: 96% (05 Jan 2024 17:55) (92% - 100%)    Parameters below as of 05 Jan 2024 17:55  Patient On (Oxygen Delivery Method): nasal cannula  O2 Flow (L/min): 2    ICU Vital Signs Last 24 Hrs  YAMIL ANNE  I&O's Detail    I&O's Summary    Drug Dosing Weight  YAMIL ANNE      PHYSICAL EXAM:  General: Appears well developed, well nourished alert and cooperative.  HEENT: Head; normocephalic, atraumatic.  Eyes: Pupils reactive, cornea wnl.  Neck: Supple, no nodes adenopathy, no NVD or carotid bruit or thyromegaly.  CARDIOVASCULAR: irreg irreg   LUNGS: No rales, rhonchi or wheeze. Normal breath sounds bilaterally.  ABDOMEN: Soft, nontender without mass or organomegaly. bowel sounds normoactive.  EXTREMITIES: No clubbing, cyanosis or edema. Distal pulses wnl.   SKIN: warm and dry with normal turgor.     LABS:  pending         YAMIL ANNE            RADIOLOGY & ADDITIONAL STUDIES:    INTERPRETATION OF TELEMETRY (personally reviewed):  labs- pending   ECGAFib/AFL      Assessment and Plan:  In summary,YAMIL ANNE is an 68y Male PMHX of SVT who presented to [facility] ED with palpitations.  Pt has had URI symptoms for the past 6 weeks.  Overnight pt with worsening of his  palpitations and feeling of brain fog.   IN ED pt found to be in SVT.  Pt reported as receiving adenosine 6mg with no effect, then 12mg with slowing of heart rate revealing atrial flutter waves. Pt given an additional 10mg diltiazem IV with improvement of symptoms and rate control, HR down to 70s.     AFib/AFL  -rate controlled on cardizem po/iv prn  -heparin gtt for AC for now  -echo   -will have EP Eval in AM   -Pulm Eval - Dr Olmstead his outpt pulm   -possible LAN/DCCV on Monday     PNA  -abx as per primary team    Thank you for allowing [facility] to participate in the care of this patient.  Please feel free to call with any questions or concerns.

## 2024-01-05 NOTE — ED ADULT NURSE NOTE - OBJECTIVE STATEMENT
Pt reports to the ED from urgent care for abnormal EKG. pt states he went to  because he felt has if his "heart was racing" from last night. Pt reports to the ED from urgent care for abnormal EKG. pt states he went to  because he felt has if his "heart was racing" from last night. wife states she put a pulse ox on him last night and HR was 150's, while sleeping HR was 106 with supplemental 02. pt states he follows a pulmonologist and never uses the home o2 but had it. pt denies any pain or discomfort. brought to [facility] for further eval. MD at bedside. Pt reports to the ED from urgent care for abnormal EKG. pt states he went to  because he felt has if his "heart was racing" from last night. wife states she put a pulse ox on him last night and HR was 150's, while sleeping HR was 106 with supplemental 02. pt states he follows a pulmonologist and never uses the home o2 but had it. pt denies any pain or discomfort or PMH other than asthma.pt brought to [facility] for further eval. MD at bedside.

## 2024-01-05 NOTE — ED ADULT NURSE NOTE - NS ED NURSE RECORD ANOTHER HT AND WT
CT Abd and Pelvis: There are bilobar hypodense hepatic lesions.   The left lobe lesion the subtle area of peripheral enhancement and measures 3.5 x 2.8 cm.  The right lobe lesion measures 1.1 x 1.0 cm.  There is an additional lesion in the caudate lobe of the liver. The lesion measures 1.4 x 1.2 cm.    Plan:   - Hepatology following apprec recs  - F/u AFP, CEA, CA 19-9, anti-mitochondrial Ab Yes CT Abd and Pelvis: There are bilobar hypodense hepatic lesions.   The left lobe lesion the subtle area of peripheral enhancement and measures 3.5 x 2.8 cm.  The right lobe lesion measures 1.1 x 1.0 cm.  There is an additional lesion in the caudate lobe of the liver. The lesion measures 1.4 x 1.2 cm.  CEA: wnl; CA 19-9: neg; acute hepatitis panel neg; LMK neg   alpha-antitrypsin: slightly elevated; ceruloplasmin: elevated     Plan:   - Hepatology following apprec recs  - F/u further hepatology labs  - F/U MRI Abd

## 2024-01-05 NOTE — ED PROVIDER NOTE - CLINICAL SUMMARY MEDICAL DECISION MAKING FREE TEXT BOX
Patient is a 69yo M with PMHx of asthma who presents to the ED complaining of shortness of breath and palpitations. Sent to CC for SVT. Gave adenosine 6mg with no effect, then 12mg with slowing of heart rate revealing atrial flutter waves. Gave 10mg diltiazem IV with improvement of symptoms and rate control, HR down to 70s. Cardiac work up and TTE ordered, called [facility] to see for new afib/flutter. Patient is a 69yo M with PMHx of asthma who presents to the ED complaining of shortness of breath and palpitations. Sent to CC for SVT. Gave adenosine 6mg with no effect, then 12mg with slowing of heart rate revealing atrial flutter waves. Gave 10mg diltiazem IV with improvement of symptoms and rate control, HR down to 70s. Cardiac work up and TTE ordered, called [facility] to see for new afib/flutter.

## 2024-01-05 NOTE — ED ADULT NURSE REASSESSMENT NOTE - NS ED NURSE REASSESS COMMENT FT1
Assuming care from ANA Suazo, review of patients history, reason for ED visit, medication administered, tests performed/to be performed, introduced to pt at bedside, updated patient as to the plan of care, pt education deemed successful after teach back shows proficiency. On heparin drip.

## 2024-01-05 NOTE — ED ADULT NURSE NOTE - NSFALLUNIVINTERV_ED_ALL_ED
Bed/Stretcher in lowest position, wheels locked, appropriate side rails in place/Call bell, personal items and telephone in reach/Instruct patient to call for assistance before getting out of bed/chair/stretcher/Non-slip footwear applied when patient is off stretcher/[place] to call system/Physically safe environment - no spills, clutter or unnecessary equipment/Purposeful proactive rounding/Room/bathroom lighting operational, light cord in reach Bed/Stretcher in lowest position, wheels locked, appropriate side rails in place/Call bell, personal items and telephone in reach/Instruct patient to call for assistance before getting out of bed/chair/stretcher/Non-slip footwear applied when patient is off stretcher/[place] to call system/Physically safe environment - no spills, clutter or unnecessary equipment/Purposeful proactive rounding/Room/bathroom lighting operational, light cord in reach

## 2024-01-05 NOTE — ED PROVIDER NOTE - OBJECTIVE STATEMENT
Patient is a 67yo M with PMHx of asthma who presents to the ED complaining of shortness of breath and palpitations. Patient states he has been coughing for the past month and a half. More short of breath than usual for the past week and a half. Last night started feeling like his heart was beating very fast, HR with the pulse ox was 140, went down to 100 when he was sleeping, then went back up this morning. Also had some confusion and "brain fog." Went to  and sent to the ED for SVT. Endorses alcohol use. Has been taking doxycycline twice a day for the past three days (was prescribed for an earlier bout of pneumonia and the pharmacy accidentally gave him a second course which he saved and started taking on his own). Denies fevers, chest pain, headache, dizziness. +Leg swelling at baseline. No cardiac history, had an exercise stress test 5 years ago with Dr. Stokes which was normal. Wife follows with [facility], Dr. Weinberg and wants him to see them as well. Patient is a 67yo M with PMHx of asthma who presents to the ED complaining of shortness of breath and palpitations. Patient states he has been coughing for the past month and a half. More short of breath than usual for the past week and a half. Last night started feeling like his heart was beating very fast, HR with the pulse ox was 140, went down to 100 when he was sleeping, then went back up this morning. Also had some confusion and "brain fog." Went to  and sent to the ED for SVT. Endorses alcohol use. Has been taking doxycycline twice a day for the past three days (was prescribed for an earlier bout of pneumonia and the pharmacy accidentally gave him a second course which he saved and started taking on his own). Denies fevers, chest pain, headache, dizziness. +Leg swelling at baseline. No cardiac history, had an exercise stress test 5 years ago with Dr. Stokes which was normal. Wife follows with [facility], Dr. Weinberg and wants him to see them as well.

## 2024-01-05 NOTE — ED ADULT TRIAGE NOTE - CHIEF COMPLAINT QUOTE
Pt presents to ED c/o palpitations. Seen at urgent care and found to be tachycardic and send to ED for eval. Endorses feeling SOB. HR 160s in triage, EKG completed and pt taken to critical care area.

## 2024-01-05 NOTE — ED CLERICAL - NS ED CLERK UNITS
TELE TELE PUI [address] [facility] [address] [facility]

## 2024-01-05 NOTE — ED PROVIDER NOTE - PHYSICAL EXAMINATION
Gen: AAOx3, NAD, obese  HEENT: Normocephalic atraumatic. EOMI. No scleral icterus. Moist mucus membranes.  CV: tachycardic, regular. Audible S1 and S2. No murmurs. 2+ radial and PT pulses   Pulm: Diminished throughout, end expiratory wheezes. No accessory muscle use or respiratory distress.  Abdomen: soft, normoactive BS, non distended, nontender, no rebound, no guarding  Musculoskeletal:  Moving all extremities equally. No gross deformity. No tenderness to palpation.  Skin: No rashes or lesions. Warm.  Neurologic: No focal neurological deficits. CN II-XII grossly intact.  Psych: Appropriate mood and affect. Cooperative.

## 2024-01-06 ENCOUNTER — TRANSCRIPTION ENCOUNTER (OUTPATIENT)
Age: 69
End: 2024-01-06

## 2024-01-06 LAB
ANION GAP SERPL CALC-SCNC: 10 MMOL/L — SIGNIFICANT CHANGE UP (ref 5–17)
ANION GAP SERPL CALC-SCNC: 10 MMOL/L — SIGNIFICANT CHANGE UP (ref 5–17)
APTT BLD: 160.1 SEC — CRITICAL HIGH (ref 24.5–35.6)
APTT BLD: 160.1 SEC — CRITICAL HIGH (ref 24.5–35.6)
APTT BLD: 80.6 SEC — HIGH (ref 24.5–35.6)
APTT BLD: 80.6 SEC — HIGH (ref 24.5–35.6)
APTT BLD: 92.6 SEC — HIGH (ref 24.5–35.6)
APTT BLD: 92.6 SEC — HIGH (ref 24.5–35.6)
BUN SERPL-MCNC: 10.7 MG/DL — SIGNIFICANT CHANGE UP (ref 8–20)
BUN SERPL-MCNC: 10.7 MG/DL — SIGNIFICANT CHANGE UP (ref 8–20)
CALCIUM SERPL-MCNC: 8.7 MG/DL — SIGNIFICANT CHANGE UP (ref 8.4–10.5)
CALCIUM SERPL-MCNC: 8.7 MG/DL — SIGNIFICANT CHANGE UP (ref 8.4–10.5)
CHLORIDE SERPL-SCNC: 101 MMOL/L — SIGNIFICANT CHANGE UP (ref 96–108)
CHLORIDE SERPL-SCNC: 101 MMOL/L — SIGNIFICANT CHANGE UP (ref 96–108)
CO2 SERPL-SCNC: 26 MMOL/L — SIGNIFICANT CHANGE UP (ref 22–29)
CO2 SERPL-SCNC: 26 MMOL/L — SIGNIFICANT CHANGE UP (ref 22–29)
CREAT SERPL-MCNC: 0.83 MG/DL — SIGNIFICANT CHANGE UP (ref 0.5–1.3)
CREAT SERPL-MCNC: 0.83 MG/DL — SIGNIFICANT CHANGE UP (ref 0.5–1.3)
EGFR: 97 ML/MIN/1.73M2 — SIGNIFICANT CHANGE UP
EGFR: 97 ML/MIN/1.73M2 — SIGNIFICANT CHANGE UP
GLUCOSE SERPL-MCNC: 189 MG/DL — HIGH (ref 70–99)
GLUCOSE SERPL-MCNC: 189 MG/DL — HIGH (ref 70–99)
HCT VFR BLD CALC: 40.1 % — SIGNIFICANT CHANGE UP (ref 39–50)
HCT VFR BLD CALC: 40.1 % — SIGNIFICANT CHANGE UP (ref 39–50)
HCT VFR BLD CALC: 40.8 % — SIGNIFICANT CHANGE UP (ref 39–50)
HCT VFR BLD CALC: 40.8 % — SIGNIFICANT CHANGE UP (ref 39–50)
HGB BLD-MCNC: 13.1 G/DL — SIGNIFICANT CHANGE UP (ref 13–17)
HGB BLD-MCNC: 13.1 G/DL — SIGNIFICANT CHANGE UP (ref 13–17)
HGB BLD-MCNC: 13.3 G/DL — SIGNIFICANT CHANGE UP (ref 13–17)
HGB BLD-MCNC: 13.3 G/DL — SIGNIFICANT CHANGE UP (ref 13–17)
MAGNESIUM SERPL-MCNC: 2.1 MG/DL — SIGNIFICANT CHANGE UP (ref 1.6–2.6)
MAGNESIUM SERPL-MCNC: 2.1 MG/DL — SIGNIFICANT CHANGE UP (ref 1.6–2.6)
MCHC RBC-ENTMCNC: 31.1 PG — SIGNIFICANT CHANGE UP (ref 27–34)
MCHC RBC-ENTMCNC: 31.1 PG — SIGNIFICANT CHANGE UP (ref 27–34)
MCHC RBC-ENTMCNC: 32 PG — SIGNIFICANT CHANGE UP (ref 27–34)
MCHC RBC-ENTMCNC: 32 PG — SIGNIFICANT CHANGE UP (ref 27–34)
MCHC RBC-ENTMCNC: 32.1 GM/DL — SIGNIFICANT CHANGE UP (ref 32–36)
MCHC RBC-ENTMCNC: 32.1 GM/DL — SIGNIFICANT CHANGE UP (ref 32–36)
MCHC RBC-ENTMCNC: 33.2 GM/DL — SIGNIFICANT CHANGE UP (ref 32–36)
MCHC RBC-ENTMCNC: 33.2 GM/DL — SIGNIFICANT CHANGE UP (ref 32–36)
MCV RBC AUTO: 96.6 FL — SIGNIFICANT CHANGE UP (ref 80–100)
MCV RBC AUTO: 96.6 FL — SIGNIFICANT CHANGE UP (ref 80–100)
MCV RBC AUTO: 96.9 FL — SIGNIFICANT CHANGE UP (ref 80–100)
MCV RBC AUTO: 96.9 FL — SIGNIFICANT CHANGE UP (ref 80–100)
PHOSPHATE SERPL-MCNC: 2.4 MG/DL — SIGNIFICANT CHANGE UP (ref 2.4–4.7)
PHOSPHATE SERPL-MCNC: 2.4 MG/DL — SIGNIFICANT CHANGE UP (ref 2.4–4.7)
PLATELET # BLD AUTO: 239 K/UL — SIGNIFICANT CHANGE UP (ref 150–400)
PLATELET # BLD AUTO: 239 K/UL — SIGNIFICANT CHANGE UP (ref 150–400)
PLATELET # BLD AUTO: 257 K/UL — SIGNIFICANT CHANGE UP (ref 150–400)
PLATELET # BLD AUTO: 257 K/UL — SIGNIFICANT CHANGE UP (ref 150–400)
POTASSIUM SERPL-MCNC: 4 MMOL/L — SIGNIFICANT CHANGE UP (ref 3.5–5.3)
POTASSIUM SERPL-MCNC: 4 MMOL/L — SIGNIFICANT CHANGE UP (ref 3.5–5.3)
POTASSIUM SERPL-SCNC: 4 MMOL/L — SIGNIFICANT CHANGE UP (ref 3.5–5.3)
POTASSIUM SERPL-SCNC: 4 MMOL/L — SIGNIFICANT CHANGE UP (ref 3.5–5.3)
RAPID RVP RESULT: SIGNIFICANT CHANGE UP
RAPID RVP RESULT: SIGNIFICANT CHANGE UP
RBC # BLD: 4.15 M/UL — LOW (ref 4.2–5.8)
RBC # BLD: 4.15 M/UL — LOW (ref 4.2–5.8)
RBC # BLD: 4.21 M/UL — SIGNIFICANT CHANGE UP (ref 4.2–5.8)
RBC # BLD: 4.21 M/UL — SIGNIFICANT CHANGE UP (ref 4.2–5.8)
RBC # FLD: 13.3 % — SIGNIFICANT CHANGE UP (ref 10.3–14.5)
SARS-COV-2 RNA SPEC QL NAA+PROBE: SIGNIFICANT CHANGE UP
SARS-COV-2 RNA SPEC QL NAA+PROBE: SIGNIFICANT CHANGE UP
SODIUM SERPL-SCNC: 137 MMOL/L — SIGNIFICANT CHANGE UP (ref 135–145)
SODIUM SERPL-SCNC: 137 MMOL/L — SIGNIFICANT CHANGE UP (ref 135–145)
TROPONIN T, HIGH SENSITIVITY RESULT: 22 NG/L — SIGNIFICANT CHANGE UP (ref 0–51)
TROPONIN T, HIGH SENSITIVITY RESULT: 22 NG/L — SIGNIFICANT CHANGE UP (ref 0–51)
TSH SERPL-MCNC: 0.67 UIU/ML — SIGNIFICANT CHANGE UP (ref 0.27–4.2)
TSH SERPL-MCNC: 0.67 UIU/ML — SIGNIFICANT CHANGE UP (ref 0.27–4.2)
WBC # BLD: 3.9 K/UL — SIGNIFICANT CHANGE UP (ref 3.8–10.5)
WBC # BLD: 3.9 K/UL — SIGNIFICANT CHANGE UP (ref 3.8–10.5)
WBC # BLD: 4.51 K/UL — SIGNIFICANT CHANGE UP (ref 3.8–10.5)
WBC # BLD: 4.51 K/UL — SIGNIFICANT CHANGE UP (ref 3.8–10.5)
WBC # FLD AUTO: 3.9 K/UL — SIGNIFICANT CHANGE UP (ref 3.8–10.5)
WBC # FLD AUTO: 3.9 K/UL — SIGNIFICANT CHANGE UP (ref 3.8–10.5)
WBC # FLD AUTO: 4.51 K/UL — SIGNIFICANT CHANGE UP (ref 3.8–10.5)
WBC # FLD AUTO: 4.51 K/UL — SIGNIFICANT CHANGE UP (ref 3.8–10.5)

## 2024-01-06 PROCEDURE — 99233 SBSQ HOSP IP/OBS HIGH 50: CPT

## 2024-01-06 RX ORDER — FUROSEMIDE 40 MG
40 TABLET ORAL DAILY
Refills: 0 | Status: DISCONTINUED | OUTPATIENT
Start: 2024-01-06 | End: 2024-01-09

## 2024-01-06 RX ORDER — DILTIAZEM HCL 120 MG
10 CAPSULE, EXT RELEASE 24 HR ORAL ONCE
Refills: 0 | Status: COMPLETED | OUTPATIENT
Start: 2024-01-06 | End: 2024-01-06

## 2024-01-06 RX ORDER — BUDESONIDE AND FORMOTEROL FUMARATE DIHYDRATE 160; 4.5 UG/1; UG/1
2 AEROSOL RESPIRATORY (INHALATION)
Refills: 0 | Status: DISCONTINUED | OUTPATIENT
Start: 2024-01-06 | End: 2024-01-09

## 2024-01-06 RX ORDER — IPRATROPIUM/ALBUTEROL SULFATE 18-103MCG
3 AEROSOL WITH ADAPTER (GRAM) INHALATION EVERY 6 HOURS
Refills: 0 | Status: DISCONTINUED | OUTPATIENT
Start: 2024-01-06 | End: 2024-01-09

## 2024-01-06 RX ORDER — DILTIAZEM HCL 120 MG
10 CAPSULE, EXT RELEASE 24 HR ORAL EVERY 6 HOURS
Refills: 0 | Status: DISCONTINUED | OUTPATIENT
Start: 2024-01-06 | End: 2024-01-09

## 2024-01-06 RX ORDER — DILTIAZEM HCL 120 MG
60 CAPSULE, EXT RELEASE 24 HR ORAL ONCE
Refills: 0 | Status: COMPLETED | OUTPATIENT
Start: 2024-01-06 | End: 2024-01-06

## 2024-01-06 RX ORDER — DILTIAZEM HCL 120 MG
90 CAPSULE, EXT RELEASE 24 HR ORAL EVERY 6 HOURS
Refills: 0 | Status: DISCONTINUED | OUTPATIENT
Start: 2024-01-06 | End: 2024-01-08

## 2024-01-06 RX ORDER — INFLUENZA VIRUS VACCINE 15; 15; 15; 15 UG/.5ML; UG/.5ML; UG/.5ML; UG/.5ML
0.7 SUSPENSION INTRAMUSCULAR ONCE
Refills: 0 | Status: DISCONTINUED | OUTPATIENT
Start: 2024-01-06 | End: 2024-01-09

## 2024-01-06 RX ORDER — FLUTICASONE PROPIONATE AND SALMETEROL 50; 250 UG/1; UG/1
1 POWDER ORAL; RESPIRATORY (INHALATION)
Refills: 0 | DISCHARGE

## 2024-01-06 RX ADMIN — HEPARIN SODIUM 2000 UNIT(S)/HR: 5000 INJECTION INTRAVENOUS; SUBCUTANEOUS at 19:21

## 2024-01-06 RX ADMIN — HEPARIN SODIUM 2000 UNIT(S)/HR: 5000 INJECTION INTRAVENOUS; SUBCUTANEOUS at 08:11

## 2024-01-06 RX ADMIN — Medication 60 MILLIGRAM(S): at 11:29

## 2024-01-06 RX ADMIN — Medication 10 MILLIGRAM(S): at 20:29

## 2024-01-06 RX ADMIN — HEPARIN SODIUM 0 UNIT(S)/HR: 5000 INJECTION INTRAVENOUS; SUBCUTANEOUS at 05:51

## 2024-01-06 RX ADMIN — BUDESONIDE AND FORMOTEROL FUMARATE DIHYDRATE 2 PUFF(S): 160; 4.5 AEROSOL RESPIRATORY (INHALATION) at 19:47

## 2024-01-06 RX ADMIN — Medication 60 MILLIGRAM(S): at 00:01

## 2024-01-06 RX ADMIN — SODIUM CHLORIDE 3 MILLILITER(S): 9 INJECTION INTRAMUSCULAR; INTRAVENOUS; SUBCUTANEOUS at 20:52

## 2024-01-06 RX ADMIN — Medication 60 MILLIGRAM(S): at 20:38

## 2024-01-06 RX ADMIN — Medication 600 MILLIGRAM(S): at 15:08

## 2024-01-06 RX ADMIN — HEPARIN SODIUM 2000 UNIT(S)/HR: 5000 INJECTION INTRAVENOUS; SUBCUTANEOUS at 20:10

## 2024-01-06 RX ADMIN — SODIUM CHLORIDE 3 MILLILITER(S): 9 INJECTION INTRAMUSCULAR; INTRAVENOUS; SUBCUTANEOUS at 05:09

## 2024-01-06 RX ADMIN — HEPARIN SODIUM 2000 UNIT(S)/HR: 5000 INJECTION INTRAVENOUS; SUBCUTANEOUS at 06:52

## 2024-01-06 RX ADMIN — Medication 10 MILLIGRAM(S): at 17:09

## 2024-01-06 RX ADMIN — SODIUM CHLORIDE 3 MILLILITER(S): 9 INJECTION INTRAMUSCULAR; INTRAVENOUS; SUBCUTANEOUS at 12:53

## 2024-01-06 RX ADMIN — HEPARIN SODIUM 2000 UNIT(S)/HR: 5000 INJECTION INTRAVENOUS; SUBCUTANEOUS at 13:33

## 2024-01-06 RX ADMIN — AZITHROMYCIN 255 MILLIGRAM(S): 500 TABLET, FILM COATED ORAL at 18:14

## 2024-01-06 RX ADMIN — Medication 40 MILLIGRAM(S): at 15:08

## 2024-01-06 NOTE — H&P ADULT - ASSESSMENT
67 y/o male with new onset Afib/flutter, hx of Asthma, Obesity    Afib/flutter:  -Admit to tele  -Con. Cardizem PO/IV and titrate as BP will tolerate  -May need Cardizem drip  -Check TSH  -Heparin gtt started in ED, no reports of bleding/falls, hbg normal  -Check echo  -LAN/Cardioversion per Cardio  -Daily lytes  -O/P sleep study as likely has GIOVANNI increasing risk for afib/flutter  -OOB, ambulate     Asthma:  -Cont. Symbicort in  place of Advair  -Hold nebs if possible with afib  -Sees Dr. Olmstead in pulm clinic     Discussed with ED staff, Patient, Daughter at bedside  69 y/o male with new onset Afib/flutter, hx of Asthma, Obesity    Afib/flutter:  -Admit to tele  -Con. Cardizem PO/IV and titrate as BP will tolerate  -May need Cardizem drip  -Check TSH  -Heparin gtt started in ED, no reports of bleding/falls, hbg normal  -Check echo  -LAN/Cardioversion per Cardio  -Daily lytes  -O/P sleep study as likely has GIOVANNI increasing risk for afib/flutter  -OOB, ambulate     Asthma:  -Cont. Symbicort in  place of Advair  -Hold nebs if possible with afib  -Sees Dr. Olmstead in pulm clinic     Discussed with ED staff, Patient, Daughter at bedside

## 2024-01-06 NOTE — H&P ADULT - HISTORY OF PRESENT ILLNESS
67 y/o male with hx of Obesity, Asthma who presents from Urgent care. He states he has had LAGUNAS/Cough for 2 weeks after multiple sick contacts with relatives during the holidays. He self prescribed Doxycycline 3 days ago but went ot urgent care due to not feeling better. Denies CP, fever, chills, N/V, or Diaphoresis. At Urgent Care he was told he was in SVT. In the ED noted to be in SVT s/p Adenosine with underlying Aflutter. Given IV/PD Cardizem with good effect and started on Heparin gtt. Denies any hx of CAD/Arrythmia. CXR with tiny B/L effusions, CBC normal, chem normal, trop neg x2. He was seen by Cardiology and advised admission for likely LAN/Cardioversion, echo, and cont. with CCB PO and IV PRN. Patient with Daughter at bedside, denies any other complaints.        69 y/o male with hx of Obesity, Asthma who presents from Urgent care. He states he has had LAGUNAS/Cough for 2 weeks after multiple sick contacts with relatives during the holidays. He self prescribed Doxycycline 3 days ago but went ot urgent care due to not feeling better. Denies CP, fever, chills, N/V, or Diaphoresis. At Urgent Care he was told he was in SVT. In the ED noted to be in SVT s/p Adenosine with underlying Aflutter. Given IV/PD Cardizem with good effect and started on Heparin gtt. Denies any hx of CAD/Arrythmia. CXR with tiny B/L effusions, CBC normal, chem normal, trop neg x2. He was seen by Cardiology and advised admission for likely LAN/Cardioversion, echo, and cont. with CCB PO and IV PRN. Patient with Daughter at bedside, denies any other complaints.

## 2024-01-06 NOTE — PROGRESS NOTE ADULT - ASSESSMENT
67 y/o male with new onset Afib/flutter, hx of Asthma, Obesity patient foind to be in aflutter and started on heparin drip and seen by cardio and is for stefania cardioversion on mon    Afib/flutter:  -Con. Cardizem PO/IV and titrate as BP will tolerate  -po cardizem increased to 90mg q6  -Check echo  -STEFANIA/Cardioversion per Cardio  -O/P sleep study as likely has GIOVANNI increasing risk for afib/flutter  -tsh normal     Asthma:  -Cont. Symbicort in  place of Advair  mucinex ordered    bronchitis - c. w azithro   \last qtc 480    dispo - npo pmn sunday night for stefania cardioversion  69 y/o male with new onset Afib/flutter, hx of Asthma, Obesity patient foind to be in aflutter and started on heparin drip and seen by cardio and is for stefania cardioversion on mon    Afib/flutter:  -Con. Cardizem PO/IV and titrate as BP will tolerate  -po cardizem increased to 90mg q6  -Check echo  -STEFANIA/Cardioversion per Cardio  -O/P sleep study as likely has GIOVANNI increasing risk for afib/flutter  -tsh normal     Asthma:  -Cont. Symbicort in  place of Advair  mucinex ordered    bronchitis - c. w azithro   \last qtc 480    dispo - npo pmn sunday night for stefania cardioversion

## 2024-01-06 NOTE — PATIENT PROFILE ADULT - DEAF OR HARD OF HEARING?
Patient wanted to let you know that she went to urgent care and was diagnosed with UTI  She is awaiting x-ray results  Wanted to make you aware  no

## 2024-01-06 NOTE — PATIENT PROFILE ADULT - FUNCTIONAL ASSESSMENT - BASIC MOBILITY 6.
3-calculated by average/Not able to assess (calculate score using [facility] averaging method)  3-calculated by average/Not able to assess (calculate score using [facility] averaging method)

## 2024-01-06 NOTE — PATIENT PROFILE ADULT - FALL HARM RISK - RISK INTERVENTIONS
Assistance with ambulation/Communicate Fall Risk and Risk Factors to all staff, patient, and family/Reinforce activity limits and safety measures with patient and family/Visual Cue: Yellow wristband/Bed in lowest position, wheels locked, appropriate side rails in place/Call bell, personal items and telephone in reach/Instruct patient to call for assistance before getting out of bed or chair/Non-slip footwear when patient is out of bed/[place] to call system/Physically safe environment - no spills, clutter or unnecessary equipment/Purposeful Proactive Rounding/Room/bathroom lighting operational, light cord in reach Assistance with ambulation/Communicate Fall Risk and Risk Factors to all staff, patient, and family/Reinforce activity limits and safety measures with patient and family/Visual Cue: Yellow wristband/Bed in lowest position, wheels locked, appropriate side rails in place/Call bell, personal items and telephone in reach/Instruct patient to call for assistance before getting out of bed or chair/Non-slip footwear when patient is out of bed/[place] to call system/Physically safe environment - no spills, clutter or unnecessary equipment/Purposeful Proactive Rounding/Room/bathroom lighting operational, light cord in reach

## 2024-01-06 NOTE — PROGRESS NOTE ADULT - SUBJECTIVE AND OBJECTIVE BOX
YAMIL ANNE    [id number]    68y      Male    INTERVAL HPI/OVERNIGHT EVENTS:  patient being seen for new onset aflutter. Patient sen      REVIEW OF SYSTEMS:    CONSTITUTIONAL: No fever, weight loss, or fatigue  RESPIRATORY: No cough, wheezing, hemoptysis; No shortness of breath  CARDIOVASCULAR: No chest pain, palpitations  GASTROINTESTINAL: No abdominal or epigastric pain. No nausea, vomiting  NEUROLOGICAL: No headaches, memory loss, loss of strength.  MISCELLANEOUS:      Vital Signs Last 24 Hrs  T(C): 36.7 (06 Jan 2024 11:41), Max: 36.9 (06 Jan 2024 07:25)  T(F): 98 (06 Jan 2024 11:41), Max: 98.4 (06 Jan 2024 07:25)  HR: 138 (06 Jan 2024 11:41) (72 - 158)  BP: 127/89 (06 Jan 2024 11:41) (99/62 - 162/116)  BP(mean): 97 (06 Jan 2024 02:01) (97 - 97)  RR: 18 (06 Jan 2024 11:41) (18 - 20)  SpO2: 94% (06 Jan 2024 11:41) (92% - 100%)    Parameters below as of 06 Jan 2024 11:41  Patient On (Oxygen Delivery Method): room air        PHYSICAL EXAM:    GENERAL: NAD, well-groomed  HEENT: PERRL, +EOMI  NECK: soft, Supple, No JVD,   CHEST/LUNG: Clear to auscultation bilaterally; No wheezing  HEART: S1S2+, Regular rate and rhythm; No murmurs, rubs, or gallops  ABDOMEN: Soft, Nontender, Nondistended; Bowel sounds present  EXTREMITIES:  2+ Peripheral Pulses, No clubbing, cyanosis, or edema  SKIN: No rashes or lesions  NEURO: AAOX3, no focal deficits, no motor r sensory loss  PSYCH: normal mood      LABS:                        13.3   3.90  )-----------( 239      ( 06 Jan 2024 04:28 )             40.1     01-06    137  |  101  |  10.7  ----------------------------<  189<H>  4.0   |  26.0  |  0.83    Ca    8.7      06 Jan 2024 02:24  Phos  2.4     01-06  Mg     2.1     01-06    TPro  7.3  /  Alb  4.0  /  TBili  1.3  /  DBili  x   /  AST  36  /  ALT  47<H>  /  AlkPhos  69  01-05    PT/INR - ( 05 Jan 2024 17:00 )   PT: 12.2 sec;   INR: 1.10 ratio         PTT - ( 06 Jan 2024 04:28 )  PTT:160.1 sec  Urinalysis Basic - ( 06 Jan 2024 02:24 )    Color: x / Appearance: x / SG: x / pH: x  Gluc: 189 mg/dL / Ketone: x  / Bili: x / Urobili: x   Blood: x / Protein: x / Nitrite: x   Leuk Esterase: x / RBC: x / WBC x   Sq Epi: x / Non Sq Epi: x / Bacteria: x          MEDICATIONS  (STANDING):  azithromycin  IVPB      azithromycin  IVPB 500 milliGRAM(s) IV Intermittent every 24 hours  budesonide 160 MICROgram(s)/formoterol 4.5 MICROgram(s) Inhaler 2 Puff(s) Inhalation two times a day  diltiazem    Tablet 60 milliGRAM(s) Oral every 6 hours  heparin  Infusion.  Unit(s)/Hr (24 mL/Hr) IV Continuous <Continuous>  influenza  Vaccine (HIGH DOSE) 0.7 milliLiter(s) IntraMuscular once  sodium chloride 0.9% lock flush 3 milliLiter(s) IV Push every 8 hours    MEDICATIONS  (PRN):  acetaminophen     Tablet .. 650 milliGRAM(s) Oral every 6 hours PRN Temp greater or equal to 38C (100.4F), Mild Pain (1 - 3)  aluminum hydroxide/magnesium hydroxide/simethicone Suspension 30 milliLiter(s) Oral every 4 hours PRN Dyspepsia  diltiazem Injectable 10 milliGRAM(s) IV Push every 6 hours PRN for heart rate above 110 bpm  heparin   Injectable 5000 Unit(s) IV Push every 6 hours PRN For aPTT between 40 - 57  heparin   Injectable 56687 Unit(s) IV Push every 6 hours PRN For aPTT less than 40  melatonin 3 milliGRAM(s) Oral at bedtime PRN Insomnia  ondansetron Injectable 4 milliGRAM(s) IV Push every 8 hours PRN Nausea and/or Vomiting      RADIOLOGY & ADDITIONAL TESTS:   YAMIL ANNE    [id number]    68y      Male    INTERVAL HPI/OVERNIGHT EVENTS:  patient being seen for new onset aflutter. Patient sen      REVIEW OF SYSTEMS:    CONSTITUTIONAL: No fever, weight loss, or fatigue  RESPIRATORY: No cough, wheezing, hemoptysis; No shortness of breath  CARDIOVASCULAR: No chest pain, palpitations  GASTROINTESTINAL: No abdominal or epigastric pain. No nausea, vomiting  NEUROLOGICAL: No headaches, memory loss, loss of strength.  MISCELLANEOUS:      Vital Signs Last 24 Hrs  T(C): 36.7 (06 Jan 2024 11:41), Max: 36.9 (06 Jan 2024 07:25)  T(F): 98 (06 Jan 2024 11:41), Max: 98.4 (06 Jan 2024 07:25)  HR: 138 (06 Jan 2024 11:41) (72 - 158)  BP: 127/89 (06 Jan 2024 11:41) (99/62 - 162/116)  BP(mean): 97 (06 Jan 2024 02:01) (97 - 97)  RR: 18 (06 Jan 2024 11:41) (18 - 20)  SpO2: 94% (06 Jan 2024 11:41) (92% - 100%)    Parameters below as of 06 Jan 2024 11:41  Patient On (Oxygen Delivery Method): room air        PHYSICAL EXAM:    GENERAL: NAD, well-groomed  HEENT: PERRL, +EOMI  NECK: soft, Supple, No JVD,   CHEST/LUNG: Clear to auscultation bilaterally; No wheezing  HEART: S1S2+, Regular rate and rhythm; No murmurs, rubs, or gallops  ABDOMEN: Soft, Nontender, Nondistended; Bowel sounds present  EXTREMITIES:  2+ Peripheral Pulses, No clubbing, cyanosis, or edema  SKIN: No rashes or lesions  NEURO: AAOX3, no focal deficits, no motor r sensory loss  PSYCH: normal mood      LABS:                        13.3   3.90  )-----------( 239      ( 06 Jan 2024 04:28 )             40.1     01-06    137  |  101  |  10.7  ----------------------------<  189<H>  4.0   |  26.0  |  0.83    Ca    8.7      06 Jan 2024 02:24  Phos  2.4     01-06  Mg     2.1     01-06    TPro  7.3  /  Alb  4.0  /  TBili  1.3  /  DBili  x   /  AST  36  /  ALT  47<H>  /  AlkPhos  69  01-05    PT/INR - ( 05 Jan 2024 17:00 )   PT: 12.2 sec;   INR: 1.10 ratio         PTT - ( 06 Jan 2024 04:28 )  PTT:160.1 sec  Urinalysis Basic - ( 06 Jan 2024 02:24 )    Color: x / Appearance: x / SG: x / pH: x  Gluc: 189 mg/dL / Ketone: x  / Bili: x / Urobili: x   Blood: x / Protein: x / Nitrite: x   Leuk Esterase: x / RBC: x / WBC x   Sq Epi: x / Non Sq Epi: x / Bacteria: x          MEDICATIONS  (STANDING):  azithromycin  IVPB      azithromycin  IVPB 500 milliGRAM(s) IV Intermittent every 24 hours  budesonide 160 MICROgram(s)/formoterol 4.5 MICROgram(s) Inhaler 2 Puff(s) Inhalation two times a day  diltiazem    Tablet 60 milliGRAM(s) Oral every 6 hours  heparin  Infusion.  Unit(s)/Hr (24 mL/Hr) IV Continuous <Continuous>  influenza  Vaccine (HIGH DOSE) 0.7 milliLiter(s) IntraMuscular once  sodium chloride 0.9% lock flush 3 milliLiter(s) IV Push every 8 hours    MEDICATIONS  (PRN):  acetaminophen     Tablet .. 650 milliGRAM(s) Oral every 6 hours PRN Temp greater or equal to 38C (100.4F), Mild Pain (1 - 3)  aluminum hydroxide/magnesium hydroxide/simethicone Suspension 30 milliLiter(s) Oral every 4 hours PRN Dyspepsia  diltiazem Injectable 10 milliGRAM(s) IV Push every 6 hours PRN for heart rate above 110 bpm  heparin   Injectable 5000 Unit(s) IV Push every 6 hours PRN For aPTT between 40 - 57  heparin   Injectable 98601 Unit(s) IV Push every 6 hours PRN For aPTT less than 40  melatonin 3 milliGRAM(s) Oral at bedtime PRN Insomnia  ondansetron Injectable 4 milliGRAM(s) IV Push every 8 hours PRN Nausea and/or Vomiting      RADIOLOGY & ADDITIONAL TESTS:   YAMIL ANNE    [id number]    68y      Male    INTERVAL HPI/OVERNIGHT EVENTS:  patient being seen for new onset aflutter. Patient seen at bedside with daughter and states feeling congested    patient is on heparin drip and still in aflutter at 110s       REVIEW OF SYSTEMS:    CONSTITUTIONAL: congested  RESPIRATORY: No cough, wheezing, hemoptysis; No shortness of breath  CARDIOVASCULAR: No chest pain, palpitations  GASTROINTESTINAL: No abdominal or epigastric pain. No nausea, vomiting  NEUROLOGICAL: No headaches, memory loss, loss of strength.  MISCELLANEOUS:      Vital Signs Last 24 Hrs  T(C): 36.7 (06 Jan 2024 11:41), Max: 36.9 (06 Jan 2024 07:25)  T(F): 98 (06 Jan 2024 11:41), Max: 98.4 (06 Jan 2024 07:25)  HR: 138 (06 Jan 2024 11:41) (72 - 158)  BP: 127/89 (06 Jan 2024 11:41) (99/62 - 162/116)  BP(mean): 97 (06 Jan 2024 02:01) (97 - 97)  RR: 18 (06 Jan 2024 11:41) (18 - 20)  SpO2: 94% (06 Jan 2024 11:41) (92% - 100%)    Parameters below as of 06 Jan 2024 11:41  Patient On (Oxygen Delivery Method): room air        PHYSICAL EXAM:    · Constitutional	no distress, obese  · Constitutional Comments	sitting up in bed in NAD with Daughter at bedside  · Eyes	conjunctiva clear  · Respiratory	clear to auscultation bilaterally; no wheezes; no rales  · Cardiovascular	S1 S2 present; Irregularly irregular rhythm; tachycardia  · Gastrointestinal	soft; nontender  · Gastrointestinal Comments	obese  · Mental Status	AAOx4  · Skin	warm and dry  · Musculoskeletal	no joint swelling  · Psychiatric	normal affect        LABS:                        13.3   3.90  )-----------( 239      ( 06 Jan 2024 04:28 )             40.1     01-06    137  |  101  |  10.7  ----------------------------<  189<H>  4.0   |  26.0  |  0.83    Ca    8.7      06 Jan 2024 02:24  Phos  2.4     01-06  Mg     2.1     01-06    TPro  7.3  /  Alb  4.0  /  TBili  1.3  /  DBili  x   /  AST  36  /  ALT  47<H>  /  AlkPhos  69  01-05    PT/INR - ( 05 Jan 2024 17:00 )   PT: 12.2 sec;   INR: 1.10 ratio         PTT - ( 06 Jan 2024 04:28 )  PTT:160.1 sec  Urinalysis Basic - ( 06 Jan 2024 02:24 )    Color: x / Appearance: x / SG: x / pH: x  Gluc: 189 mg/dL / Ketone: x  / Bili: x / Urobili: x   Blood: x / Protein: x / Nitrite: x   Leuk Esterase: x / RBC: x / WBC x   Sq Epi: x / Non Sq Epi: x / Bacteria: x          MEDICATIONS  (STANDING):  azithromycin  IVPB      azithromycin  IVPB 500 milliGRAM(s) IV Intermittent every 24 hours  budesonide 160 MICROgram(s)/formoterol 4.5 MICROgram(s) Inhaler 2 Puff(s) Inhalation two times a day  diltiazem    Tablet 60 milliGRAM(s) Oral every 6 hours  heparin  Infusion.  Unit(s)/Hr (24 mL/Hr) IV Continuous <Continuous>  influenza  Vaccine (HIGH DOSE) 0.7 milliLiter(s) IntraMuscular once  sodium chloride 0.9% lock flush 3 milliLiter(s) IV Push every 8 hours    MEDICATIONS  (PRN):  acetaminophen     Tablet .. 650 milliGRAM(s) Oral every 6 hours PRN Temp greater or equal to 38C (100.4F), Mild Pain (1 - 3)  aluminum hydroxide/magnesium hydroxide/simethicone Suspension 30 milliLiter(s) Oral every 4 hours PRN Dyspepsia  diltiazem Injectable 10 milliGRAM(s) IV Push every 6 hours PRN for heart rate above 110 bpm  heparin   Injectable 5000 Unit(s) IV Push every 6 hours PRN For aPTT between 40 - 57  heparin   Injectable 27749 Unit(s) IV Push every 6 hours PRN For aPTT less than 40  melatonin 3 milliGRAM(s) Oral at bedtime PRN Insomnia  ondansetron Injectable 4 milliGRAM(s) IV Push every 8 hours PRN Nausea and/or Vomiting      RADIOLOGY & ADDITIONAL TESTS:   YAMIL ANNE    [id number]    68y      Male    INTERVAL HPI/OVERNIGHT EVENTS:  patient being seen for new onset aflutter. Patient seen at bedside with daughter and states feeling congested    patient is on heparin drip and still in aflutter at 110s       REVIEW OF SYSTEMS:    CONSTITUTIONAL: congested  RESPIRATORY: No cough, wheezing, hemoptysis; No shortness of breath  CARDIOVASCULAR: No chest pain, palpitations  GASTROINTESTINAL: No abdominal or epigastric pain. No nausea, vomiting  NEUROLOGICAL: No headaches, memory loss, loss of strength.  MISCELLANEOUS:      Vital Signs Last 24 Hrs  T(C): 36.7 (06 Jan 2024 11:41), Max: 36.9 (06 Jan 2024 07:25)  T(F): 98 (06 Jan 2024 11:41), Max: 98.4 (06 Jan 2024 07:25)  HR: 138 (06 Jan 2024 11:41) (72 - 158)  BP: 127/89 (06 Jan 2024 11:41) (99/62 - 162/116)  BP(mean): 97 (06 Jan 2024 02:01) (97 - 97)  RR: 18 (06 Jan 2024 11:41) (18 - 20)  SpO2: 94% (06 Jan 2024 11:41) (92% - 100%)    Parameters below as of 06 Jan 2024 11:41  Patient On (Oxygen Delivery Method): room air        PHYSICAL EXAM:    · Constitutional	no distress, obese  · Constitutional Comments	sitting up in bed in NAD with Daughter at bedside  · Eyes	conjunctiva clear  · Respiratory	clear to auscultation bilaterally; no wheezes; no rales  · Cardiovascular	S1 S2 present; Irregularly irregular rhythm; tachycardia  · Gastrointestinal	soft; nontender  · Gastrointestinal Comments	obese  · Mental Status	AAOx4  · Skin	warm and dry  · Musculoskeletal	no joint swelling  · Psychiatric	normal affect        LABS:                        13.3   3.90  )-----------( 239      ( 06 Jan 2024 04:28 )             40.1     01-06    137  |  101  |  10.7  ----------------------------<  189<H>  4.0   |  26.0  |  0.83    Ca    8.7      06 Jan 2024 02:24  Phos  2.4     01-06  Mg     2.1     01-06    TPro  7.3  /  Alb  4.0  /  TBili  1.3  /  DBili  x   /  AST  36  /  ALT  47<H>  /  AlkPhos  69  01-05    PT/INR - ( 05 Jan 2024 17:00 )   PT: 12.2 sec;   INR: 1.10 ratio         PTT - ( 06 Jan 2024 04:28 )  PTT:160.1 sec  Urinalysis Basic - ( 06 Jan 2024 02:24 )    Color: x / Appearance: x / SG: x / pH: x  Gluc: 189 mg/dL / Ketone: x  / Bili: x / Urobili: x   Blood: x / Protein: x / Nitrite: x   Leuk Esterase: x / RBC: x / WBC x   Sq Epi: x / Non Sq Epi: x / Bacteria: x          MEDICATIONS  (STANDING):  azithromycin  IVPB      azithromycin  IVPB 500 milliGRAM(s) IV Intermittent every 24 hours  budesonide 160 MICROgram(s)/formoterol 4.5 MICROgram(s) Inhaler 2 Puff(s) Inhalation two times a day  diltiazem    Tablet 60 milliGRAM(s) Oral every 6 hours  heparin  Infusion.  Unit(s)/Hr (24 mL/Hr) IV Continuous <Continuous>  influenza  Vaccine (HIGH DOSE) 0.7 milliLiter(s) IntraMuscular once  sodium chloride 0.9% lock flush 3 milliLiter(s) IV Push every 8 hours    MEDICATIONS  (PRN):  acetaminophen     Tablet .. 650 milliGRAM(s) Oral every 6 hours PRN Temp greater or equal to 38C (100.4F), Mild Pain (1 - 3)  aluminum hydroxide/magnesium hydroxide/simethicone Suspension 30 milliLiter(s) Oral every 4 hours PRN Dyspepsia  diltiazem Injectable 10 milliGRAM(s) IV Push every 6 hours PRN for heart rate above 110 bpm  heparin   Injectable 5000 Unit(s) IV Push every 6 hours PRN For aPTT between 40 - 57  heparin   Injectable 41188 Unit(s) IV Push every 6 hours PRN For aPTT less than 40  melatonin 3 milliGRAM(s) Oral at bedtime PRN Insomnia  ondansetron Injectable 4 milliGRAM(s) IV Push every 8 hours PRN Nausea and/or Vomiting      RADIOLOGY & ADDITIONAL TESTS:

## 2024-01-06 NOTE — H&P ADULT - NSHPOUTPATIENTPROVIDERS_GEN_ALL_CORE
PMD: Aydee  [facility]: [facility]dee  [facility]

## 2024-01-06 NOTE — PROGRESS NOTE ADULT - SUBJECTIVE AND OBJECTIVE BOX
[place] [facility] - [facility], [facility]                                   [address] [place] [address]                                                      PHONE: [phone]                                                         FAX: [phone]  [email or web address]  ---------------------------------------------------------------------------------------------------------------------------------    Overnight events/patient complaints: cough, mild palpitations. AFL persists.      aspirin (Short breath)    MEDICATIONS  (STANDING):  azithromycin  IVPB 500 milliGRAM(s) IV Intermittent every 24 hours  azithromycin  IVPB      budesonide 160 MICROgram(s)/formoterol 4.5 MICROgram(s) Inhaler 2 Puff(s) Inhalation two times a day  diltiazem    Tablet 60 milliGRAM(s) Oral every 6 hours  heparin  Infusion.  Unit(s)/Hr (24 mL/Hr) IV Continuous <Continuous>  influenza  Vaccine (HIGH DOSE) 0.7 milliLiter(s) IntraMuscular once  sodium chloride 0.9% lock flush 3 milliLiter(s) IV Push every 8 hours    MEDICATIONS  (PRN):  acetaminophen     Tablet .. 650 milliGRAM(s) Oral every 6 hours PRN Temp greater or equal to 38C (100.4F), Mild Pain (1 - 3)  aluminum hydroxide/magnesium hydroxide/simethicone Suspension 30 milliLiter(s) Oral every 4 hours PRN Dyspepsia  diltiazem Injectable 10 milliGRAM(s) IV Push every 6 hours PRN for heart rate above 110 bpm  heparin   Injectable 70309 Unit(s) IV Push every 6 hours PRN For aPTT less than 40  heparin   Injectable 5000 Unit(s) IV Push every 6 hours PRN For aPTT between 40 - 57  melatonin 3 milliGRAM(s) Oral at bedtime PRN Insomnia  ondansetron Injectable 4 milliGRAM(s) IV Push every 8 hours PRN Nausea and/or Vomiting      Vital Signs Last 24 Hrs  T(C): 36.7 (06 Jan 2024 11:41), Max: 36.9 (06 Jan 2024 07:25)  T(F): 98 (06 Jan 2024 11:41), Max: 98.4 (06 Jan 2024 07:25)  HR: 138 (06 Jan 2024 11:41) (72 - 158)  BP: 127/89 (06 Jan 2024 11:41) (99/62 - 162/116)  BP(mean): 97 (06 Jan 2024 02:01) (97 - 97)  RR: 18 (06 Jan 2024 11:41) (18 - 20)  SpO2: 94% (06 Jan 2024 11:41) (92% - 100%)    Parameters below as of 06 Jan 2024 11:41  Patient On (Oxygen Delivery Method): room air      ICU Vital Signs Last 24 Hrs  YAMIL ANNE  I&O's Detail    I&O's Summary    Drug Dosing Weight  YAMIL ANNE      PHYSICAL EXAM:  General: NAD.  HEENT: Head; normocephalic.  Eyes: Pupils reactive.  Neck: Supple.  CARDIOVASCULAR: Irregular.   LUNGS: Normal breath sounds bilaterally.  ABDOMEN: Soft.  EXTREMITIES: No clubbing, cyanosis or edema.   SKIN: warm.  NEURO: Alert/oriented x 3.    PSYCH: normal affect.        LABS:                        13.3   3.90  )-----------( 239      ( 06 Jan 2024 04:28 )             40.1     01-06    137  |  101  |  10.7  ----------------------------<  189<H>  4.0   |  26.0  |  0.83    Ca    8.7      06 Jan 2024 02:24  Phos  2.4     01-06  Mg     2.1     01-06    TPro  7.3  /  Alb  4.0  /  TBili  1.3  /  DBili  x   /  AST  36  /  ALT  47<H>  /  AlkPhos  69  01-05    YAMIL ANNE      PT/INR - ( 05 Jan 2024 17:00 )   PT: 12.2 sec;   INR: 1.10 ratio         PTT - ( 06 Jan 2024 04:28 )  PTT:160.1 sec  Urinalysis Basic - ( 06 Jan 2024 02:24 )    Color: x / Appearance: x / SG: x / pH: x  Gluc: 189 mg/dL / Ketone: x  / Bili: x / Urobili: x   Blood: x / Protein: x / Nitrite: x   Leuk Esterase: x / RBC: x / WBC x   Sq Epi: x / Non Sq Epi: x / Bacteria: x        RADIOLOGY & ADDITIONAL STUDIES:    INTERPRETATION OF TELEMETRY (personally reviewed): AFL with variable AV conduction    ECG: typical atrial flutter at 98 bpm      ASSESSMENT AND PLAN:  68y Male with past medical history significant for asthma and possible GIOVANNI with recent URI sx's including cough c/o palpitations found to have new onset typical AFL. Patient started on IV heparin. Has has annual  physical, and reports last EKG was NSR in May 2023. He has a pulmonologist and had a prior sleep study, but never followed up.    1. AFL persists. Rate controlled on CCB.  2. Continue IV heparin. Transition to NOAC prior to d/c.  3. Obtain 2D echo to eval EF, wall motion, and valves.  4. Plan for LAN-electrical CV on Monday if AFL persists.  5. Outpatient f/u with me to discuss ablative therapy.  6. Patient and family agreeable with above plan.                     [place] [facility] - [facility]                                   [address] [place] [address]                                                      PHONE: [phone]                                                         FAX: [phone]  [email or web address]  ---------------------------------------------------------------------------------------------------------------------------------    Overnight events/patient complaints: cough, mild palpitations. AFL persists.      aspirin (Short breath)    MEDICATIONS  (STANDING):  azithromycin  IVPB 500 milliGRAM(s) IV Intermittent every 24 hours  azithromycin  IVPB      budesonide 160 MICROgram(s)/formoterol 4.5 MICROgram(s) Inhaler 2 Puff(s) Inhalation two times a day  diltiazem    Tablet 60 milliGRAM(s) Oral every 6 hours  heparin  Infusion.  Unit(s)/Hr (24 mL/Hr) IV Continuous <Continuous>  influenza  Vaccine (HIGH DOSE) 0.7 milliLiter(s) IntraMuscular once  sodium chloride 0.9% lock flush 3 milliLiter(s) IV Push every 8 hours    MEDICATIONS  (PRN):  acetaminophen     Tablet .. 650 milliGRAM(s) Oral every 6 hours PRN Temp greater or equal to 38C (100.4F), Mild Pain (1 - 3)  aluminum hydroxide/magnesium hydroxide/simethicone Suspension 30 milliLiter(s) Oral every 4 hours PRN Dyspepsia  diltiazem Injectable 10 milliGRAM(s) IV Push every 6 hours PRN for heart rate above 110 bpm  heparin   Injectable 03373 Unit(s) IV Push every 6 hours PRN For aPTT less than 40  heparin   Injectable 5000 Unit(s) IV Push every 6 hours PRN For aPTT between 40 - 57  melatonin 3 milliGRAM(s) Oral at bedtime PRN Insomnia  ondansetron Injectable 4 milliGRAM(s) IV Push every 8 hours PRN Nausea and/or Vomiting      Vital Signs Last 24 Hrs  T(C): 36.7 (06 Jan 2024 11:41), Max: 36.9 (06 Jan 2024 07:25)  T(F): 98 (06 Jan 2024 11:41), Max: 98.4 (06 Jan 2024 07:25)  HR: 138 (06 Jan 2024 11:41) (72 - 158)  BP: 127/89 (06 Jan 2024 11:41) (99/62 - 162/116)  BP(mean): 97 (06 Jan 2024 02:01) (97 - 97)  RR: 18 (06 Jan 2024 11:41) (18 - 20)  SpO2: 94% (06 Jan 2024 11:41) (92% - 100%)    Parameters below as of 06 Jan 2024 11:41  Patient On (Oxygen Delivery Method): room air      ICU Vital Signs Last 24 Hrs  YAMIL ANNE  I&O's Detail    I&O's Summary    Drug Dosing Weight  YAMIL ANNE      PHYSICAL EXAM:  General: NAD.  HEENT: Head; normocephalic.  Eyes: Pupils reactive.  Neck: Supple.  CARDIOVASCULAR: Irregular.   LUNGS: Normal breath sounds bilaterally.  ABDOMEN: Soft.  EXTREMITIES: No clubbing, cyanosis or edema.   SKIN: warm.  NEURO: Alert/oriented x 3.    PSYCH: normal affect.        LABS:                        13.3   3.90  )-----------( 239      ( 06 Jan 2024 04:28 )             40.1     01-06    137  |  101  |  10.7  ----------------------------<  189<H>  4.0   |  26.0  |  0.83    Ca    8.7      06 Jan 2024 02:24  Phos  2.4     01-06  Mg     2.1     01-06    TPro  7.3  /  Alb  4.0  /  TBili  1.3  /  DBili  x   /  AST  36  /  ALT  47<H>  /  AlkPhos  69  01-05    YAMIL NANE      PT/INR - ( 05 Jan 2024 17:00 )   PT: 12.2 sec;   INR: 1.10 ratio         PTT - ( 06 Jan 2024 04:28 )  PTT:160.1 sec  Urinalysis Basic - ( 06 Jan 2024 02:24 )    Color: x / Appearance: x / SG: x / pH: x  Gluc: 189 mg/dL / Ketone: x  / Bili: x / Urobili: x   Blood: x / Protein: x / Nitrite: x   Leuk Esterase: x / RBC: x / WBC x   Sq Epi: x / Non Sq Epi: x / Bacteria: x        RADIOLOGY & ADDITIONAL STUDIES:    INTERPRETATION OF TELEMETRY (personally reviewed): AFL with variable AV conduction    ECG: typical atrial flutter at 98 bpm      ASSESSMENT AND PLAN:  68y Male with past medical history significant for asthma and possible GIOVANNI with recent URI sx's including cough c/o palpitations found to have new onset typical AFL. Patient started on IV heparin. Has has annual  physical, and reports last EKG was NSR in May 2023. He has a pulmonologist and had a prior sleep study, but never followed up.    1. AFL persists. Rate controlled on CCB.  2. Continue IV heparin. Transition to NOAC prior to d/c.  3. Obtain 2D echo to eval EF, wall motion, and valves.  4. Plan for LAN-electrical CV on Monday if AFL persists.  5. Outpatient f/u with me to discuss ablative therapy.  6. Patient and family agreeable with above plan.

## 2024-01-07 LAB
ALBUMIN SERPL ELPH-MCNC: 3.8 G/DL — SIGNIFICANT CHANGE UP (ref 3.3–5.2)
ALBUMIN SERPL ELPH-MCNC: 3.8 G/DL — SIGNIFICANT CHANGE UP (ref 3.3–5.2)
ALP SERPL-CCNC: 56 U/L — SIGNIFICANT CHANGE UP (ref 40–120)
ALP SERPL-CCNC: 56 U/L — SIGNIFICANT CHANGE UP (ref 40–120)
ALT FLD-CCNC: 39 U/L — SIGNIFICANT CHANGE UP
ALT FLD-CCNC: 39 U/L — SIGNIFICANT CHANGE UP
ANION GAP SERPL CALC-SCNC: 10 MMOL/L — SIGNIFICANT CHANGE UP (ref 5–17)
ANION GAP SERPL CALC-SCNC: 10 MMOL/L — SIGNIFICANT CHANGE UP (ref 5–17)
APTT BLD: 87.2 SEC — HIGH (ref 24.5–35.6)
APTT BLD: 87.2 SEC — HIGH (ref 24.5–35.6)
AST SERPL-CCNC: 17 U/L — SIGNIFICANT CHANGE UP
AST SERPL-CCNC: 17 U/L — SIGNIFICANT CHANGE UP
BILIRUB SERPL-MCNC: 0.8 MG/DL — SIGNIFICANT CHANGE UP (ref 0.4–2)
BILIRUB SERPL-MCNC: 0.8 MG/DL — SIGNIFICANT CHANGE UP (ref 0.4–2)
BLD GP AB SCN SERPL QL: SIGNIFICANT CHANGE UP
BLD GP AB SCN SERPL QL: SIGNIFICANT CHANGE UP
BUN SERPL-MCNC: 18.7 MG/DL — SIGNIFICANT CHANGE UP (ref 8–20)
BUN SERPL-MCNC: 18.7 MG/DL — SIGNIFICANT CHANGE UP (ref 8–20)
CALCIUM SERPL-MCNC: 9.1 MG/DL — SIGNIFICANT CHANGE UP (ref 8.4–10.5)
CALCIUM SERPL-MCNC: 9.1 MG/DL — SIGNIFICANT CHANGE UP (ref 8.4–10.5)
CHLORIDE SERPL-SCNC: 100 MMOL/L — SIGNIFICANT CHANGE UP (ref 96–108)
CHLORIDE SERPL-SCNC: 100 MMOL/L — SIGNIFICANT CHANGE UP (ref 96–108)
CO2 SERPL-SCNC: 29 MMOL/L — SIGNIFICANT CHANGE UP (ref 22–29)
CO2 SERPL-SCNC: 29 MMOL/L — SIGNIFICANT CHANGE UP (ref 22–29)
CREAT SERPL-MCNC: 0.82 MG/DL — SIGNIFICANT CHANGE UP (ref 0.5–1.3)
CREAT SERPL-MCNC: 0.82 MG/DL — SIGNIFICANT CHANGE UP (ref 0.5–1.3)
EGFR: 97 ML/MIN/1.73M2 — SIGNIFICANT CHANGE UP
EGFR: 97 ML/MIN/1.73M2 — SIGNIFICANT CHANGE UP
GLUCOSE SERPL-MCNC: 127 MG/DL — HIGH (ref 70–99)
GLUCOSE SERPL-MCNC: 127 MG/DL — HIGH (ref 70–99)
HCT VFR BLD CALC: 41.5 % — SIGNIFICANT CHANGE UP (ref 39–50)
HCT VFR BLD CALC: 41.5 % — SIGNIFICANT CHANGE UP (ref 39–50)
HCV AB S/CO SERPL IA: 0.11 S/CO — SIGNIFICANT CHANGE UP (ref 0–0.99)
HCV AB S/CO SERPL IA: 0.11 S/CO — SIGNIFICANT CHANGE UP (ref 0–0.99)
HCV AB SERPL-IMP: SIGNIFICANT CHANGE UP
HCV AB SERPL-IMP: SIGNIFICANT CHANGE UP
HGB BLD-MCNC: 13 G/DL — SIGNIFICANT CHANGE UP (ref 13–17)
HGB BLD-MCNC: 13 G/DL — SIGNIFICANT CHANGE UP (ref 13–17)
MAGNESIUM SERPL-MCNC: 2.3 MG/DL — SIGNIFICANT CHANGE UP (ref 1.6–2.6)
MAGNESIUM SERPL-MCNC: 2.3 MG/DL — SIGNIFICANT CHANGE UP (ref 1.6–2.6)
MCHC RBC-ENTMCNC: 31 PG — SIGNIFICANT CHANGE UP (ref 27–34)
MCHC RBC-ENTMCNC: 31 PG — SIGNIFICANT CHANGE UP (ref 27–34)
MCHC RBC-ENTMCNC: 31.3 GM/DL — LOW (ref 32–36)
MCHC RBC-ENTMCNC: 31.3 GM/DL — LOW (ref 32–36)
MCV RBC AUTO: 98.8 FL — SIGNIFICANT CHANGE UP (ref 80–100)
MCV RBC AUTO: 98.8 FL — SIGNIFICANT CHANGE UP (ref 80–100)
PLATELET # BLD AUTO: 247 K/UL — SIGNIFICANT CHANGE UP (ref 150–400)
PLATELET # BLD AUTO: 247 K/UL — SIGNIFICANT CHANGE UP (ref 150–400)
POTASSIUM SERPL-MCNC: 4.1 MMOL/L — SIGNIFICANT CHANGE UP (ref 3.5–5.3)
POTASSIUM SERPL-MCNC: 4.1 MMOL/L — SIGNIFICANT CHANGE UP (ref 3.5–5.3)
POTASSIUM SERPL-SCNC: 4.1 MMOL/L — SIGNIFICANT CHANGE UP (ref 3.5–5.3)
POTASSIUM SERPL-SCNC: 4.1 MMOL/L — SIGNIFICANT CHANGE UP (ref 3.5–5.3)
PROT SERPL-MCNC: 6.7 G/DL — SIGNIFICANT CHANGE UP (ref 6.6–8.7)
PROT SERPL-MCNC: 6.7 G/DL — SIGNIFICANT CHANGE UP (ref 6.6–8.7)
RBC # BLD: 4.2 M/UL — SIGNIFICANT CHANGE UP (ref 4.2–5.8)
RBC # BLD: 4.2 M/UL — SIGNIFICANT CHANGE UP (ref 4.2–5.8)
RBC # FLD: 13.2 % — SIGNIFICANT CHANGE UP (ref 10.3–14.5)
RBC # FLD: 13.2 % — SIGNIFICANT CHANGE UP (ref 10.3–14.5)
SODIUM SERPL-SCNC: 139 MMOL/L — SIGNIFICANT CHANGE UP (ref 135–145)
SODIUM SERPL-SCNC: 139 MMOL/L — SIGNIFICANT CHANGE UP (ref 135–145)
WBC # BLD: 7.55 K/UL — SIGNIFICANT CHANGE UP (ref 3.8–10.5)
WBC # BLD: 7.55 K/UL — SIGNIFICANT CHANGE UP (ref 3.8–10.5)
WBC # FLD AUTO: 7.55 K/UL — SIGNIFICANT CHANGE UP (ref 3.8–10.5)
WBC # FLD AUTO: 7.55 K/UL — SIGNIFICANT CHANGE UP (ref 3.8–10.5)

## 2024-01-07 PROCEDURE — 99232 SBSQ HOSP IP/OBS MODERATE 35: CPT

## 2024-01-07 PROCEDURE — 93306 TTE W/DOPPLER COMPLETE: CPT | Mod: 26

## 2024-01-07 RX ADMIN — Medication 600 MILLIGRAM(S): at 02:24

## 2024-01-07 RX ADMIN — BUDESONIDE AND FORMOTEROL FUMARATE DIHYDRATE 2 PUFF(S): 160; 4.5 AEROSOL RESPIRATORY (INHALATION) at 22:39

## 2024-01-07 RX ADMIN — HEPARIN SODIUM 2000 UNIT(S)/HR: 5000 INJECTION INTRAVENOUS; SUBCUTANEOUS at 23:38

## 2024-01-07 RX ADMIN — Medication 600 MILLIGRAM(S): at 17:41

## 2024-01-07 RX ADMIN — Medication 90 MILLIGRAM(S): at 13:20

## 2024-01-07 RX ADMIN — Medication 90 MILLIGRAM(S): at 23:33

## 2024-01-07 RX ADMIN — HEPARIN SODIUM 2000 UNIT(S)/HR: 5000 INJECTION INTRAVENOUS; SUBCUTANEOUS at 07:00

## 2024-01-07 RX ADMIN — Medication 40 MILLIGRAM(S): at 07:01

## 2024-01-07 RX ADMIN — SODIUM CHLORIDE 3 MILLILITER(S): 9 INJECTION INTRAMUSCULAR; INTRAVENOUS; SUBCUTANEOUS at 06:00

## 2024-01-07 RX ADMIN — SODIUM CHLORIDE 3 MILLILITER(S): 9 INJECTION INTRAMUSCULAR; INTRAVENOUS; SUBCUTANEOUS at 21:45

## 2024-01-07 RX ADMIN — Medication 90 MILLIGRAM(S): at 02:25

## 2024-01-07 RX ADMIN — AZITHROMYCIN 255 MILLIGRAM(S): 500 TABLET, FILM COATED ORAL at 22:39

## 2024-01-07 RX ADMIN — Medication 90 MILLIGRAM(S): at 17:41

## 2024-01-07 RX ADMIN — HEPARIN SODIUM 2000 UNIT(S)/HR: 5000 INJECTION INTRAVENOUS; SUBCUTANEOUS at 13:21

## 2024-01-07 RX ADMIN — SODIUM CHLORIDE 3 MILLILITER(S): 9 INJECTION INTRAMUSCULAR; INTRAVENOUS; SUBCUTANEOUS at 13:12

## 2024-01-07 RX ADMIN — BUDESONIDE AND FORMOTEROL FUMARATE DIHYDRATE 2 PUFF(S): 160; 4.5 AEROSOL RESPIRATORY (INHALATION) at 11:43

## 2024-01-07 RX ADMIN — Medication 600 MILLIGRAM(S): at 09:28

## 2024-01-07 RX ADMIN — Medication 90 MILLIGRAM(S): at 09:28

## 2024-01-07 NOTE — PROGRESS NOTE ADULT - SUBJECTIVE AND OBJECTIVE BOX
[facility] Division of Medicine    SUBJECTIVE / OVERNIGHT EVENTS: Pt seen at the bedside.  Patient denies chest pain, SOB, abd pain, N/V, fever, chills, dysuria or any other complaints. All remainder ROS negative.     MEDICATIONS  (STANDING):  azithromycin  IVPB      azithromycin  IVPB 500 milliGRAM(s) IV Intermittent every 24 hours  budesonide 160 MICROgram(s)/formoterol 4.5 MICROgram(s) Inhaler 2 Puff(s) Inhalation two times a day  diltiazem    Tablet 90 milliGRAM(s) Oral every 6 hours  furosemide   Injectable 40 milliGRAM(s) IV Push daily  guaiFENesin  milliGRAM(s) Oral every 12 hours  heparin  Infusion.  Unit(s)/Hr (24 mL/Hr) IV Continuous <Continuous>  influenza  Vaccine (HIGH DOSE) 0.7 milliLiter(s) IntraMuscular once  sodium chloride 0.9% lock flush 3 milliLiter(s) IV Push every 8 hours    MEDICATIONS  (PRN):  acetaminophen     Tablet .. 650 milliGRAM(s) Oral every 6 hours PRN Temp greater or equal to 38C (100.4F), Mild Pain (1 - 3)  albuterol/ipratropium for Nebulization 3 milliLiter(s) Nebulizer every 6 hours PRN Bronchospasm  aluminum hydroxide/magnesium hydroxide/simethicone Suspension 30 milliLiter(s) Oral every 4 hours PRN Dyspepsia  diltiazem Injectable 10 milliGRAM(s) IV Push every 6 hours PRN for heart rate above 110 bpm  heparin   Injectable 5000 Unit(s) IV Push every 6 hours PRN For aPTT between 40 - 57  heparin   Injectable 98806 Unit(s) IV Push every 6 hours PRN For aPTT less than 40  melatonin 3 milliGRAM(s) Oral at bedtime PRN Insomnia  ondansetron Injectable 4 milliGRAM(s) IV Push every 8 hours PRN Nausea and/or Vomiting      I&O's Summary    06 Jan 2024 07:01  -  07 Jan 2024 07:00  --------------------------------------------------------  IN: 280 mL / OUT: 300 mL / NET: -20 mL        PHYSICAL EXAM:  Vital Signs Last 24 Hrs  T(C): 36.7 (07 Jan 2024 11:14), Max: 37 (06 Jan 2024 15:43)  T(F): 98.1 (07 Jan 2024 11:14), Max: 98.6 (06 Jan 2024 15:43)  HR: 76 (07 Jan 2024 11:14) (74 - 150)  BP: 111/67 (07 Jan 2024 11:14) (100/44 - 150/77)  BP(mean): --  RR: 18 (07 Jan 2024 11:14) (16 - 19)  SpO2: 89% (07 Jan 2024 11:14) (89% - 96%)    Parameters below as of 07 Jan 2024 11:14  Patient On (Oxygen Delivery Method): room air          GENERAL: not in acute distress  HEENT:  Clear conjunctiva, PERRL, moist oral mucosa   RESP:  Non-labored breathing pattern, lungs clear to ausculation, no wheezes or crackles appreciated  CV: irregular rhythm, intermittently tachycardic (mostly on exertion), no murmurs appreciated, no lower extremity edema, peripheral pulses are 2+ bilaterally  GI: Soft, non-tender, non-distended  NEURO: Awake, alert, conversant, upper and lower extremity strength grossly intact  PSYCH: Calm, cooperative, A&Ox3  SKIN: No rash or lesions, warm and dry      LABS:                        13.0   7.55  )-----------( 247      ( 07 Jan 2024 05:16 )             41.5     01-07    139  |  100  |  18.7  ----------------------------<  127<H>  4.1   |  29.0  |  0.82    Ca    9.1      07 Jan 2024 05:16  Phos  2.4     01-06  Mg     2.3     01-07    TPro  6.7  /  Alb  3.8  /  TBili  0.8  /  DBili  x   /  AST  17  /  ALT  39  /  AlkPhos  56  01-07    PT/INR - ( 05 Jan 2024 17:00 )   PT: 12.2 sec;   INR: 1.10 ratio         PTT - ( 07 Jan 2024 05:16 )  PTT:87.2 sec      Urinalysis Basic - ( 07 Jan 2024 05:16 )    Color: x / Appearance: x / SG: x / pH: x  Gluc: 127 mg/dL / Ketone: x  / Bili: x / Urobili: x   Blood: x / Protein: x / Nitrite: x   Leuk Esterase: x / RBC: x / WBC x   Sq Epi: x / Non Sq Epi: x / Bacteria: x        CAPILLARY BLOOD GLUCOSE          IMAGING:                                   [facility] Division of Medicine    SUBJECTIVE / OVERNIGHT EVENTS: Pt seen at the bedside.  Patient denies chest pain, SOB, abd pain, N/V, fever, chills, dysuria or any other complaints. All remainder ROS negative.     MEDICATIONS  (STANDING):  azithromycin  IVPB      azithromycin  IVPB 500 milliGRAM(s) IV Intermittent every 24 hours  budesonide 160 MICROgram(s)/formoterol 4.5 MICROgram(s) Inhaler 2 Puff(s) Inhalation two times a day  diltiazem    Tablet 90 milliGRAM(s) Oral every 6 hours  furosemide   Injectable 40 milliGRAM(s) IV Push daily  guaiFENesin  milliGRAM(s) Oral every 12 hours  heparin  Infusion.  Unit(s)/Hr (24 mL/Hr) IV Continuous <Continuous>  influenza  Vaccine (HIGH DOSE) 0.7 milliLiter(s) IntraMuscular once  sodium chloride 0.9% lock flush 3 milliLiter(s) IV Push every 8 hours    MEDICATIONS  (PRN):  acetaminophen     Tablet .. 650 milliGRAM(s) Oral every 6 hours PRN Temp greater or equal to 38C (100.4F), Mild Pain (1 - 3)  albuterol/ipratropium for Nebulization 3 milliLiter(s) Nebulizer every 6 hours PRN Bronchospasm  aluminum hydroxide/magnesium hydroxide/simethicone Suspension 30 milliLiter(s) Oral every 4 hours PRN Dyspepsia  diltiazem Injectable 10 milliGRAM(s) IV Push every 6 hours PRN for heart rate above 110 bpm  heparin   Injectable 5000 Unit(s) IV Push every 6 hours PRN For aPTT between 40 - 57  heparin   Injectable 63379 Unit(s) IV Push every 6 hours PRN For aPTT less than 40  melatonin 3 milliGRAM(s) Oral at bedtime PRN Insomnia  ondansetron Injectable 4 milliGRAM(s) IV Push every 8 hours PRN Nausea and/or Vomiting      I&O's Summary    06 Jan 2024 07:01  -  07 Jan 2024 07:00  --------------------------------------------------------  IN: 280 mL / OUT: 300 mL / NET: -20 mL        PHYSICAL EXAM:  Vital Signs Last 24 Hrs  T(C): 36.7 (07 Jan 2024 11:14), Max: 37 (06 Jan 2024 15:43)  T(F): 98.1 (07 Jan 2024 11:14), Max: 98.6 (06 Jan 2024 15:43)  HR: 76 (07 Jan 2024 11:14) (74 - 150)  BP: 111/67 (07 Jan 2024 11:14) (100/44 - 150/77)  BP(mean): --  RR: 18 (07 Jan 2024 11:14) (16 - 19)  SpO2: 89% (07 Jan 2024 11:14) (89% - 96%)    Parameters below as of 07 Jan 2024 11:14  Patient On (Oxygen Delivery Method): room air          GENERAL: not in acute distress  HEENT:  Clear conjunctiva, PERRL, moist oral mucosa   RESP:  Non-labored breathing pattern, lungs clear to ausculation, no wheezes or crackles appreciated  CV: irregular rhythm, intermittently tachycardic (mostly on exertion), no murmurs appreciated, no lower extremity edema, peripheral pulses are 2+ bilaterally  GI: Soft, non-tender, non-distended  NEURO: Awake, alert, conversant, upper and lower extremity strength grossly intact  PSYCH: Calm, cooperative, A&Ox3  SKIN: No rash or lesions, warm and dry      LABS:                        13.0   7.55  )-----------( 247      ( 07 Jan 2024 05:16 )             41.5     01-07    139  |  100  |  18.7  ----------------------------<  127<H>  4.1   |  29.0  |  0.82    Ca    9.1      07 Jan 2024 05:16  Phos  2.4     01-06  Mg     2.3     01-07    TPro  6.7  /  Alb  3.8  /  TBili  0.8  /  DBili  x   /  AST  17  /  ALT  39  /  AlkPhos  56  01-07    PT/INR - ( 05 Jan 2024 17:00 )   PT: 12.2 sec;   INR: 1.10 ratio         PTT - ( 07 Jan 2024 05:16 )  PTT:87.2 sec      Urinalysis Basic - ( 07 Jan 2024 05:16 )    Color: x / Appearance: x / SG: x / pH: x  Gluc: 127 mg/dL / Ketone: x  / Bili: x / Urobili: x   Blood: x / Protein: x / Nitrite: x   Leuk Esterase: x / RBC: x / WBC x   Sq Epi: x / Non Sq Epi: x / Bacteria: x        CAPILLARY BLOOD GLUCOSE          IMAGING:

## 2024-01-07 NOTE — PROGRESS NOTE ADULT - ASSESSMENT
69 y/o male with new onset Afib/flutter, hx of Asthma, Obesity patient foind to be in aflutter and started on heparin drip and seen by cardio and is for stefania cardioversion on mon    Afib/flutter:  - persistent today, tachycardic mostly on exertion, pt asymptomatic   - c/w Cardizem 90mg q6hr PO and titrate as BP will tolerates  - IV cardizem PRN  - Check echo  - c/w heparin ggt, plan to switch to oral a/c after cardioversion (wife requesting coumadin, cardio rec NOAC)  - STEFANIA/Cardioversion on Monday per Cardio  - if conversion fails pt to follow up outpatient for ablation  - following cardioversion pt to switch to oral AC (wife requesting coumadin, cardio recommending NOAC)  - tsh normal     Suspected GIOVANNI and OHS  - Cont. Symbicort in place of Advair  - nocturnal BIPAP  - pt's wife states sleep study done 1 year ago but pt never followed up  - advised to follow up with pulm following d/c    bronchitis - c. w azithro     DVT ppx: heparin ggt

## 2024-01-07 NOTE — PROGRESS NOTE ADULT - SUBJECTIVE AND OBJECTIVE BOX
[facility] THE [facility]                                   [address] [place] [address]                                                      PHONE: [phone]                                                         FAX: [phone]  [email or web address]  ---------------------------------------------------------------------------------------------------------------------------------    Overnight events/patient complaints: AFL persists, rate controlled. Comfortable. Wife at bedside.      aspirin (Short breath)    MEDICATIONS  (STANDING):  azithromycin  IVPB      azithromycin  IVPB 500 milliGRAM(s) IV Intermittent every 24 hours  budesonide 160 MICROgram(s)/formoterol 4.5 MICROgram(s) Inhaler 2 Puff(s) Inhalation two times a day  diltiazem    Tablet 90 milliGRAM(s) Oral every 6 hours  furosemide   Injectable 40 milliGRAM(s) IV Push daily  guaiFENesin  milliGRAM(s) Oral every 12 hours  heparin  Infusion.  Unit(s)/Hr (24 mL/Hr) IV Continuous <Continuous>  influenza  Vaccine (HIGH DOSE) 0.7 milliLiter(s) IntraMuscular once  sodium chloride 0.9% lock flush 3 milliLiter(s) IV Push every 8 hours    MEDICATIONS  (PRN):  acetaminophen     Tablet .. 650 milliGRAM(s) Oral every 6 hours PRN Temp greater or equal to 38C (100.4F), Mild Pain (1 - 3)  albuterol/ipratropium for Nebulization 3 milliLiter(s) Nebulizer every 6 hours PRN Bronchospasm  aluminum hydroxide/magnesium hydroxide/simethicone Suspension 30 milliLiter(s) Oral every 4 hours PRN Dyspepsia  diltiazem Injectable 10 milliGRAM(s) IV Push every 6 hours PRN for heart rate above 110 bpm  heparin   Injectable 5000 Unit(s) IV Push every 6 hours PRN For aPTT between 40 - 57  heparin   Injectable 05241 Unit(s) IV Push every 6 hours PRN For aPTT less than 40  melatonin 3 milliGRAM(s) Oral at bedtime PRN Insomnia  ondansetron Injectable 4 milliGRAM(s) IV Push every 8 hours PRN Nausea and/or Vomiting      Vital Signs Last 24 Hrs  T(C): 36.7 (07 Jan 2024 11:14), Max: 37 (06 Jan 2024 15:43)  T(F): 98.1 (07 Jan 2024 11:14), Max: 98.6 (06 Jan 2024 15:43)  HR: 76 (07 Jan 2024 11:14) (74 - 150)  BP: 111/67 (07 Jan 2024 11:14) (100/44 - 150/77)  BP(mean): --  RR: 18 (07 Jan 2024 11:14) (16 - 19)  SpO2: 89% (07 Jan 2024 11:14) (89% - 96%)    Parameters below as of 07 Jan 2024 11:14  Patient On (Oxygen Delivery Method): room air      ICU Vital Signs Last 24 Hrs  YAMIL ANNE  I&O's Detail    06 Jan 2024 07:01  -  07 Jan 2024 07:00  --------------------------------------------------------  IN:    Heparin Infusion: 160 mL    Oral Fluid: 120 mL  Total IN: 280 mL    OUT:    Voided (mL): 300 mL  Total OUT: 300 mL    Total NET: -20 mL        I&O's Summary    06 Jan 2024 07:01  -  07 Jan 2024 07:00  --------------------------------------------------------  IN: 280 mL / OUT: 300 mL / NET: -20 mL      Drug Dosing Weight  YAMIL ANNE      PHYSICAL EXAM:  General: NAD.  HEENT: Head; normocephalic.  Eyes: Pupils reactive.  Neck: Supple.  CARDIOVASCULAR: Irregular.   LUNGS: Normal breath sounds bilaterally.  ABDOMEN: Soft.  EXTREMITIES: No clubbing, cyanosis or edema.   SKIN: warm.  NEURO: Alert/oriented x 3.    PSYCH: normal affect.        LABS:                        13.0   7.55  )-----------( 247      ( 07 Jan 2024 05:16 )             41.5     01-07    139  |  100  |  18.7  ----------------------------<  127<H>  4.1   |  29.0  |  0.82    Ca    9.1      07 Jan 2024 05:16  Phos  2.4     01-06  Mg     2.3     01-07    TPro  6.7  /  Alb  3.8  /  TBili  0.8  /  DBili  x   /  AST  17  /  ALT  39  /  AlkPhos  56  01-07    YAMIL ANNE      PT/INR - ( 05 Jan 2024 17:00 )   PT: 12.2 sec;   INR: 1.10 ratio         PTT - ( 07 Jan 2024 05:16 )  PTT:87.2 sec  Urinalysis Basic - ( 07 Jan 2024 05:16 )    Color: x / Appearance: x / SG: x / pH: x  Gluc: 127 mg/dL / Ketone: x  / Bili: x / Urobili: x   Blood: x / Protein: x / Nitrite: x   Leuk Esterase: x / RBC: x / WBC x   Sq Epi: x / Non Sq Epi: x / Bacteria: x        RADIOLOGY & ADDITIONAL STUDIES:    INTERPRETATION OF TELEMETRY (personally reviewed): AFL with variable AV conduction; rate controlled in 70's    ECG: typical atrial flutter at 98 bpm      ASSESSMENT AND PLAN:  68y Male with past medical history significant for asthma and possible GIOVANNI with recent URI sx's including cough c/o palpitations found to have new onset typical AFL. Patient started on IV heparin. He has an annual  physical, and reports last EKG was NSR in May 2023. He has a pulmonologist and had a prior sleep study, but never followed up.    1. AFL persists. Rate controlled on CCB.  2. Continue IV heparin. Transition to NOAC prior to d/c. However, wife is requesting Coumadin. I strongly prefer and recommend NOAC.  3. Awaiting 2D echo to eval EF, wall motion, and valves.  4. Plan for LAN-electrical CV tomorrow given AFL persists.  5. Outpatient f/u with me to discuss ablative therapy upon discharge.  6. We discussed the importance of weight loss (current weight 320 lbs) and treatment of GIOVANNI. Needs pulmonary f/u.  6. Patient and wife agreeable with above plan.                               [facility] - [facility], [facility]                                   [address] [place] [address]                                                      PHONE: [phone]                                                         FAX: [phone]  [email or web address]  ---------------------------------------------------------------------------------------------------------------------------------    Overnight events/patient complaints: AFL persists, rate controlled. Comfortable. Wife at bedside.      aspirin (Short breath)    MEDICATIONS  (STANDING):  azithromycin  IVPB      azithromycin  IVPB 500 milliGRAM(s) IV Intermittent every 24 hours  budesonide 160 MICROgram(s)/formoterol 4.5 MICROgram(s) Inhaler 2 Puff(s) Inhalation two times a day  diltiazem    Tablet 90 milliGRAM(s) Oral every 6 hours  furosemide   Injectable 40 milliGRAM(s) IV Push daily  guaiFENesin  milliGRAM(s) Oral every 12 hours  heparin  Infusion.  Unit(s)/Hr (24 mL/Hr) IV Continuous <Continuous>  influenza  Vaccine (HIGH DOSE) 0.7 milliLiter(s) IntraMuscular once  sodium chloride 0.9% lock flush 3 milliLiter(s) IV Push every 8 hours    MEDICATIONS  (PRN):  acetaminophen     Tablet .. 650 milliGRAM(s) Oral every 6 hours PRN Temp greater or equal to 38C (100.4F), Mild Pain (1 - 3)  albuterol/ipratropium for Nebulization 3 milliLiter(s) Nebulizer every 6 hours PRN Bronchospasm  aluminum hydroxide/magnesium hydroxide/simethicone Suspension 30 milliLiter(s) Oral every 4 hours PRN Dyspepsia  diltiazem Injectable 10 milliGRAM(s) IV Push every 6 hours PRN for heart rate above 110 bpm  heparin   Injectable 5000 Unit(s) IV Push every 6 hours PRN For aPTT between 40 - 57  heparin   Injectable 33385 Unit(s) IV Push every 6 hours PRN For aPTT less than 40  melatonin 3 milliGRAM(s) Oral at bedtime PRN Insomnia  ondansetron Injectable 4 milliGRAM(s) IV Push every 8 hours PRN Nausea and/or Vomiting      Vital Signs Last 24 Hrs  T(C): 36.7 (07 Jan 2024 11:14), Max: 37 (06 Jan 2024 15:43)  T(F): 98.1 (07 Jan 2024 11:14), Max: 98.6 (06 Jan 2024 15:43)  HR: 76 (07 Jan 2024 11:14) (74 - 150)  BP: 111/67 (07 Jan 2024 11:14) (100/44 - 150/77)  BP(mean): --  RR: 18 (07 Jan 2024 11:14) (16 - 19)  SpO2: 89% (07 Jan 2024 11:14) (89% - 96%)    Parameters below as of 07 Jan 2024 11:14  Patient On (Oxygen Delivery Method): room air      ICU Vital Signs Last 24 Hrs  YAMIL ANNE  I&O's Detail    06 Jan 2024 07:01  -  07 Jan 2024 07:00  --------------------------------------------------------  IN:    Heparin Infusion: 160 mL    Oral Fluid: 120 mL  Total IN: 280 mL    OUT:    Voided (mL): 300 mL  Total OUT: 300 mL    Total NET: -20 mL        I&O's Summary    06 Jan 2024 07:01  -  07 Jan 2024 07:00  --------------------------------------------------------  IN: 280 mL / OUT: 300 mL / NET: -20 mL      Drug Dosing Weight  YAMIL ANNE      PHYSICAL EXAM:  General: NAD.  HEENT: Head; normocephalic.  Eyes: Pupils reactive.  Neck: Supple.  CARDIOVASCULAR: Irregular.   LUNGS: Normal breath sounds bilaterally.  ABDOMEN: Soft.  EXTREMITIES: No clubbing, cyanosis or edema.   SKIN: warm.  NEURO: Alert/oriented x 3.    PSYCH: normal affect.        LABS:                        13.0   7.55  )-----------( 247      ( 07 Jan 2024 05:16 )             41.5     01-07    139  |  100  |  18.7  ----------------------------<  127<H>  4.1   |  29.0  |  0.82    Ca    9.1      07 Jan 2024 05:16  Phos  2.4     01-06  Mg     2.3     01-07    TPro  6.7  /  Alb  3.8  /  TBili  0.8  /  DBili  x   /  AST  17  /  ALT  39  /  AlkPhos  56  01-07    AYMIL ANNE      PT/INR - ( 05 Jan 2024 17:00 )   PT: 12.2 sec;   INR: 1.10 ratio         PTT - ( 07 Jan 2024 05:16 )  PTT:87.2 sec  Urinalysis Basic - ( 07 Jan 2024 05:16 )    Color: x / Appearance: x / SG: x / pH: x  Gluc: 127 mg/dL / Ketone: x  / Bili: x / Urobili: x   Blood: x / Protein: x / Nitrite: x   Leuk Esterase: x / RBC: x / WBC x   Sq Epi: x / Non Sq Epi: x / Bacteria: x        RADIOLOGY & ADDITIONAL STUDIES:    INTERPRETATION OF TELEMETRY (personally reviewed): AFL with variable AV conduction; rate controlled in 70's    ECG: typical atrial flutter at 98 bpm      ASSESSMENT AND PLAN:  68y Male with past medical history significant for asthma and possible GIOVANNI with recent URI sx's including cough c/o palpitations found to have new onset typical AFL. Patient started on IV heparin. He has an annual  physical, and reports last EKG was NSR in May 2023. He has a pulmonologist and had a prior sleep study, but never followed up.    1. AFL persists. Rate controlled on CCB.  2. Continue IV heparin. Transition to NOAC prior to d/c. However, wife is requesting Coumadin. I strongly prefer and recommend NOAC.  3. Awaiting 2D echo to eval EF, wall motion, and valves.  4. Plan for LAN-electrical CV tomorrow given AFL persists.  5. Outpatient f/u with me to discuss ablative therapy upon discharge.  6. We discussed the importance of weight loss (current weight 320 lbs) and treatment of GIOVANNI. Needs pulmonary f/u.  6. Patient and wife agreeable with above plan.

## 2024-01-08 ENCOUNTER — TRANSCRIPTION ENCOUNTER (OUTPATIENT)
Age: 69
End: 2024-01-08

## 2024-01-08 PROBLEM — E66.9 OBESITY, UNSPECIFIED: Chronic | Status: ACTIVE | Noted: 2024-01-06

## 2024-01-08 PROBLEM — J45.909 UNSPECIFIED ASTHMA, UNCOMPLICATED: Chronic | Status: ACTIVE | Noted: 2024-01-05

## 2024-01-08 LAB
APTT BLD: 78.6 SEC — HIGH (ref 24.5–35.6)
APTT BLD: 78.6 SEC — HIGH (ref 24.5–35.6)

## 2024-01-08 PROCEDURE — 93320 DOPPLER ECHO COMPLETE: CPT | Mod: 26

## 2024-01-08 PROCEDURE — 93010 ELECTROCARDIOGRAM REPORT: CPT

## 2024-01-08 PROCEDURE — 99232 SBSQ HOSP IP/OBS MODERATE 35: CPT

## 2024-01-08 PROCEDURE — 93312 ECHO TRANSESOPHAGEAL: CPT | Mod: 26

## 2024-01-08 PROCEDURE — 93325 DOPPLER ECHO COLOR FLOW MAPG: CPT | Mod: 26

## 2024-01-08 RX ORDER — DILTIAZEM HCL 120 MG
30 CAPSULE, EXT RELEASE 24 HR ORAL ONCE
Refills: 0 | Status: COMPLETED | OUTPATIENT
Start: 2024-01-08 | End: 2024-01-08

## 2024-01-08 RX ORDER — APIXABAN 2.5 MG/1
5 TABLET, FILM COATED ORAL EVERY 12 HOURS
Refills: 0 | Status: DISCONTINUED | OUTPATIENT
Start: 2024-01-08 | End: 2024-01-09

## 2024-01-08 RX ORDER — DILTIAZEM HCL 120 MG
120 CAPSULE, EXT RELEASE 24 HR ORAL DAILY
Refills: 0 | Status: DISCONTINUED | OUTPATIENT
Start: 2024-01-09 | End: 2024-01-09

## 2024-01-08 RX ADMIN — APIXABAN 5 MILLIGRAM(S): 2.5 TABLET, FILM COATED ORAL at 12:58

## 2024-01-08 RX ADMIN — Medication 3 MILLILITER(S): at 21:06

## 2024-01-08 RX ADMIN — SODIUM CHLORIDE 3 MILLILITER(S): 9 INJECTION INTRAMUSCULAR; INTRAVENOUS; SUBCUTANEOUS at 22:22

## 2024-01-08 RX ADMIN — BUDESONIDE AND FORMOTEROL FUMARATE DIHYDRATE 2 PUFF(S): 160; 4.5 AEROSOL RESPIRATORY (INHALATION) at 08:13

## 2024-01-08 RX ADMIN — Medication 90 MILLIGRAM(S): at 07:36

## 2024-01-08 RX ADMIN — HEPARIN SODIUM 2000 UNIT(S)/HR: 5000 INJECTION INTRAVENOUS; SUBCUTANEOUS at 03:22

## 2024-01-08 RX ADMIN — BUDESONIDE AND FORMOTEROL FUMARATE DIHYDRATE 2 PUFF(S): 160; 4.5 AEROSOL RESPIRATORY (INHALATION) at 21:07

## 2024-01-08 RX ADMIN — HEPARIN SODIUM 2000 UNIT(S)/HR: 5000 INJECTION INTRAVENOUS; SUBCUTANEOUS at 07:37

## 2024-01-08 RX ADMIN — Medication 40 MILLIGRAM(S): at 06:25

## 2024-01-08 RX ADMIN — SODIUM CHLORIDE 3 MILLILITER(S): 9 INJECTION INTRAMUSCULAR; INTRAVENOUS; SUBCUTANEOUS at 13:01

## 2024-01-08 RX ADMIN — SODIUM CHLORIDE 3 MILLILITER(S): 9 INJECTION INTRAMUSCULAR; INTRAVENOUS; SUBCUTANEOUS at 07:11

## 2024-01-08 RX ADMIN — HEPARIN SODIUM 2000 UNIT(S)/HR: 5000 INJECTION INTRAVENOUS; SUBCUTANEOUS at 08:20

## 2024-01-08 RX ADMIN — AZITHROMYCIN 255 MILLIGRAM(S): 500 TABLET, FILM COATED ORAL at 17:20

## 2024-01-08 RX ADMIN — Medication 600 MILLIGRAM(S): at 17:20

## 2024-01-08 RX ADMIN — Medication 30 MILLIGRAM(S): at 17:20

## 2024-01-08 RX ADMIN — APIXABAN 5 MILLIGRAM(S): 2.5 TABLET, FILM COATED ORAL at 23:22

## 2024-01-08 NOTE — DISCHARGE NOTE PROVIDER - CARE PROVIDERS DIRECT ADDRESSES
,DirectAddress_Unknown,DirectAddress_Unknown,[email or web address] ,DirectAddress_Unknown,DirectAddress_Unknown,[email or web address]

## 2024-01-08 NOTE — DISCHARGE NOTE PROVIDER - ATTENDING DISCHARGE PHYSICAL EXAMINATION:
GENERAL: not in acute distress  HEENT:  Clear conjunctiva, PERRL, moist oral mucosa   RESP:  Non-labored breathing pattern, lungs clear to ausculation, no wheezes or crackles appreciated  CV: irregular rhythm, intermittently tachycardic (mostly on exertion), no murmurs appreciated, no lower extremity edema, peripheral pulses are 2+ bilaterally  GI: Soft, non-tender, non-distended  NEURO: Awake, alert, conversant, upper and lower extremity strength grossly intact  PSYCH: Calm, cooperative, A&Ox3  SKIN: No rash or lesions, warm and dry

## 2024-01-08 NOTE — PROGRESS NOTE ADULT - SUBJECTIVE AND OBJECTIVE BOX
Briefly: 68 year old male patient with a history significant for asthma and possible GIOVANNI with recent URI sx's including cough c/o palpitations found to have new onset typical counterclockwise Aflutter. Patient started on IV heparin. He has an annual  physical, and reports last EKG was NSR in May 2023. He has a pulmonologist and had a prior sleep study, but never followed up.  - Arrived in fasting state  - Plan for LAN/DCCV today.

## 2024-01-08 NOTE — PROGRESS NOTE ADULT - SUBJECTIVE AND OBJECTIVE BOX
Pt doing well s/p uncomplicated LAN & DCCV (250J x 1). Denies any complaints post procedure.     Exam:   VSS, NAD, A&O x 3  Skin: no erythema/edema/blistering at defib pad sites.   Card: S1/S2, RRR, no m/g/r  Resp: lungs CTA b/l  Abd: S/NT/ND  Ext: no edema, distal pulses intact    EKG: Sinus rhythm with intact conduction. QRS 110ms. CO 196ms.  LAN: Preserved LVEF. No ANTONI thrombus. No valvular abnormalities. (Preliminary read)    Assessment:   68 year old male patient with a history significant for asthma and possible GIOVANNI with recent URI sx's including cough c/o palpitations found to have new onset typical counterclockwise Aflutter. Patient started on IV heparin. He is now status post LAN negative for ANTONI thrombus and uncomplicated DCCV (250J x 1) with restoration of sinus rhythm. Pt reports no complaints post procedure.      Plan:   Observation on telemetry per post op protocol.    Resume PO intake.   Ambulate w/ assist once fully awake & back to baseline mental status w/ VSS.  Start Eliquis 5mg Q12HR Importance of strict compliance with anticoagulation regimen reinforced with pt.   STOP Heparin infusion @ 1230 (1 hour post first dose of Eliquis)  Resume other home medications.  Dispo per primary team  Outpatient follow up with [facility] in 1 - 2 weeks    Pt doing well s/p uncomplicated LAN & DCCV (250J x 1). Denies any complaints post procedure.     Exam:   VSS, NAD, A&O x 3  Skin: no erythema/edema/blistering at defib pad sites.   Card: S1/S2, RRR, no m/g/r  Resp: lungs CTA b/l  Abd: S/NT/ND  Ext: no edema, distal pulses intact    EKG: Sinus rhythm with intact conduction. QRS 110ms. IL 196ms.  LAN: Preserved LVEF. No ANTONI thrombus. No valvular abnormalities. (Preliminary read)    Assessment:   68 year old male patient with a history significant for asthma and possible GIOVANNI with recent URI sx's including cough c/o palpitations found to have new onset typical counterclockwise Aflutter. Patient started on IV heparin. He is now status post LAN negative for ANTONI thrombus and uncomplicated DCCV (250J x 1) with restoration of sinus rhythm. Pt reports no complaints post procedure.      Plan:   Observation on telemetry per post op protocol.    Resume PO intake.   Ambulate w/ assist once fully awake & back to baseline mental status w/ VSS.  Start Eliquis 5mg Q12HR Importance of strict compliance with anticoagulation regimen reinforced with pt.   STOP Heparin infusion @ 1230 (1 hour post first dose of Eliquis)  Resume other home medications.  Dispo per primary team  Outpatient follow up with [facility] in 1 - 2 weeks    Pt doing well s/p uncomplicated LAN & DCCV (250J x 1). Denies any complaints post procedure.     Exam:   VSS, NAD, A&O x 3  Skin: no erythema/edema/blistering at defib pad sites.   Card: S1/S2, RRR, no m/g/r  Resp: lungs CTA b/l  Abd: S/NT/ND  Ext: no edema, distal pulses intact    EKG: Sinus rhythm with intact conduction. QRS 110ms. MD 196ms.  LAN: Preserved LVEF. No ANTONI thrombus. No valvular abnormalities. (Preliminary read)    Assessment:   68 year old male patient with a history significant for asthma and possible GIOVANNI with recent URI sx's including cough c/o palpitations found to have new onset typical counterclockwise Aflutter. Patient started on IV heparin. He is now status post LAN negative for ANTONI thrombus and uncomplicated DCCV (250J x 1) with restoration of sinus rhythm. Pt reports no complaints post procedure.      Plan:   Observation on telemetry per post op protocol.    NPO until 1230  Ambulate w/ assist once fully awake & back to baseline mental status w/ VSS.  Start Eliquis 5mg Q12HR @1230 Importance of strict compliance with anticoagulation regimen reinforced with pt.   STOP Heparin infusion @ 1330 (1 hour post first dose of Eliquis)  Dispo per primary team  Outpatient follow up with [facility] in 1 - 2 weeks    Pt doing well s/p uncomplicated LAN & DCCV (250J x 1). Denies any complaints post procedure.     Exam:   VSS, NAD, A&O x 3  Skin: no erythema/edema/blistering at defib pad sites.   Card: S1/S2, RRR, no m/g/r  Resp: lungs CTA b/l  Abd: S/NT/ND  Ext: no edema, distal pulses intact    EKG: Sinus rhythm with intact conduction. QRS 110ms. NC 196ms.  LAN: Preserved LVEF. No ANTONI thrombus. No valvular abnormalities. (Preliminary read)    Assessment:   68 year old male patient with a history significant for asthma and possible GIOVANNI with recent URI sx's including cough c/o palpitations found to have new onset typical counterclockwise Aflutter. Patient started on IV heparin. He is now status post LAN negative for ANTONI thrombus and uncomplicated DCCV (250J x 1) with restoration of sinus rhythm. Pt reports no complaints post procedure.      Plan:   Observation on telemetry per post op protocol.    NPO until 1230  Ambulate w/ assist once fully awake & back to baseline mental status w/ VSS.  Start Eliquis 5mg Q12HR @1230 Importance of strict compliance with anticoagulation regimen reinforced with pt.   STOP Heparin infusion @ 1330 (1 hour post first dose of Eliquis)  Dispo per primary team  Outpatient follow up with [facility] in 1 - 2 weeks

## 2024-01-08 NOTE — DISCHARGE NOTE PROVIDER - NSDCCPTREATMENT_GEN_ALL_CORE_FT
PRINCIPAL PROCEDURE  Procedure: Transesophageal echocardiography (LAN) with external cardioverison  Findings and Treatment: -Take each dose of your anticoagulation medication (blood thinner) exactly as prescribed.   -Resume your diet with soft foods first.  - Do not drive, operate heavy machinery or make important decisions for 24 hours following the procedure.  - You may resume all other activities the day after the procedure.  Call your doctor if:   -you develop a fever, cough up blood, have any chest pain, palpitations or difficulty breathing. You may take a throat lozenge if you develop a sore throat or try warm salt water gargle.   - you have any questions or concerns regarding the procedure.  If you experience increased difficulty breathing or chest pain, or if you faint, have dizzy spells, or for any other distressing symptom, please seek immediate medical attention.

## 2024-01-08 NOTE — DISCHARGE NOTE PROVIDER - NSDCFUADDINST_GEN_ALL_CORE_FT
Follow up with Dr. Hermosillo as an outpatient to arrange for an ablation of atrial flutter.   Follow up with Dr. Olmstead to treat your sleep apnea.

## 2024-01-08 NOTE — DISCHARGE NOTE PROVIDER - HOSPITAL COURSE
68 year old male patient with a history significant for asthma and possible GIOVANNI with recent URI sx's including cough c/o palpitations found to have new onset typical counterclockwise Aflutter who is now s/p successful LAN guided DCCV 68 year old male patient with a history significant for asthma and possible GIOVANNI with recent URI sx's including cough c/o palpitations found to have new onset typical counterclockwise Aflutter who is now s/p successful LAN guided DCCV. Rate controlled on diltiazem. Pt is hemodynamically stable and optimized for discharge today. 68 year old male patient with a history significant for asthma and possible GIOVANNI with recent URI sx's including cough c/o palpitations found to have new onset typical counterclockwise Aflutter who is now s/p successful LAN guided DCCV. Rate controlled on diltiazem. Pt is hemodynamically stable and optimized for discharge today with close follow up set up with cardiology, EP and pulmonary.

## 2024-01-08 NOTE — DISCHARGE NOTE PROVIDER - CARE PROVIDER_API CALL
Sarah Weinberg  Cardiology  [address] [place] [address]  Phone: [phone]  Fax: [phone]  Established Patient  Follow Up Time: Routine    Fabio Hermosillo  Cardiac Electrophysiology  [address] [place] [address]  Phone: [phone]  Fax: [phone]  Established Patient  Follow Up Time: 2 weeks    Shaka Olmstead  Pulmonary Disease  [address] [place] [address], [address] [address]  Phone: [phone]  Fax: [phone]  Follow Up Time: Routine   Sarah Weinberg  Cardiology  [address] [place] [address]  Phone: [phone]  Fax: [phone]  Established Patient  Follow Up Time: Routine    Fabio Hermosillo  Cardiac Electrophysiology  [address] [place] [address]  Phone: [phone]  Fax: [phone]  Established Patient  Follow Up Time: 2 weeks    Shaka Olmstead  Pulmonary Disease  [address] [place] [address], [address] [address]  Phone: [phone]  Fax: [phone]  Follow Up Time: Routine

## 2024-01-08 NOTE — PROGRESS NOTE ADULT - ASSESSMENT
67 y/o male with new onset Afib/flutter, hx of Asthma, Obesity patient foind to be in aflutter and started on heparin drip and seen by cardio and is for stefania cardioversion on mon    Afib/flutter:  - s/p successful cardioversion on 1/8, now sinus rhythm  - pt received cardizem 90mg this AM  - spoke with EP, will give additional cardizem 30mg later today followed by 120mg XR qd  - PO and titrate as BP will tolerates  - IV cardizem PRN  - heparin changed to eliquis  - if remains stable overnight then d/c tomorrow  - if reverts back to afib pt to follow up outpatient for ablation    Suspected GIOVANNI and OHS  - Cont. Symbicort in place of Advair  - nocturnal BIPAP  - pt's wife states sleep study done 1 year ago but pt never followed up  - spoke with pulmonary, pt needs to f/u outpatient, appointment made for 2/8    bronchitis  - c/w azithro    DVT ppx: heparin ggt

## 2024-01-08 NOTE — DISCHARGE NOTE PROVIDER - PROVIDER TOKENS
PROVIDER:[TOKEN:[[id number]],FOLLOWUP:[Routine],ESTABLISHEDPATIENT:[T]],PROVIDER:[TOKEN:[[id number]],FOLLOWUP:[2 weeks],ESTABLISHEDPATIENT:[T]],PROVIDER:[TOKEN:[[id number]],FOLLOWUP:[Routine]]

## 2024-01-08 NOTE — PROGRESS NOTE ADULT - SUBJECTIVE AND OBJECTIVE BOX
HISTORY OF PRESENT ILLNESS:  The patient is a 66 year old male coming in today for a medication check and to get some refills of his medications. He has no current concerns. He states that he did investigate the [facility] weight loss program but decided that it was cost prohibitive for him to participate in that.   REVIEW OF SYSTEMS:  The rest of the cardiovascular, respiratory, gastrointestinal, neurologic, urinary and musculoskeletal and all other systems are reviewed and are negative except as noted.    Past Medical History:   Diagnosis Date   • Essential (primary) hypertension    • Gout        ALLERGIES:   Allergen Reactions   • Magnesium HIVES       Current Outpatient Prescriptions   Medication Sig   • losartan (COZAAR) 100 MG tablet Take 1 tablet by mouth daily.   • spironolactone (ALDACTONE) 25 MG tablet Take 1 tablet by mouth 2 times daily.   • carvedilol (COREG) 12.5 MG tablet Take 1 tablet by mouth 2 times daily (with meals) along with 25 mg tablets for a total dose of 37.5 mg.   • carvedilol (COREG) 25 MG tablet Take 1 tablet by mouth 2 times daily (with meals) along with a 12.5 mg tablet for a total dose of 37.5 mg.   • allopurinol (ZYLOPRIM) 100 MG tablet Take 4 tablets by mouth daily.   • dilTIAZem (DILACOR XR) 240 MG 24 hr capsule Take 1 capsule by mouth daily.   • Omega-3 Fatty Acids (FISH OIL) 1000 MG capsule Take 1 capsule by mouth daily.   • aspirin 81 MG chewable tablet Chew 1 tablet by mouth daily.     No current facility-administered medications for this visit.        Social History   Substance Use Topics   • Smoking status: Former Smoker     Types: Cigarettes     Quit date: 1/1/2014   • Smokeless tobacco: Never Used   • Alcohol use No       No family history on file.        PHYSICAL EXAM:  VITAL SIGNS:   Visit Vitals  /60 (BP Location: RUST, Patient Position: Sitting, Cuff Size: Large Adult)   Pulse 80   Temp 98.3 °F (36.8 °C) (Tympanic)   Wt (!) 146.3 kg   BMI 43.75 kg/m²     GENERAL: Appears  [facility]    SUBJECTIVE / OVERNIGHT EVENTS: Pt seen at the bedside. S/p LAN w/ cardioversion today. Now in normal sinus rhythm and reports feeling much better. Patient denies chest pain, SOB, abd pain, N/V, fever, chills, dysuria or any other complaints. All remainder ROS negative.     MEDICATIONS  (STANDING):  apixaban 5 milliGRAM(s) Oral every 12 hours  azithromycin  IVPB 500 milliGRAM(s) IV Intermittent every 24 hours  azithromycin  IVPB      budesonide 160 MICROgram(s)/formoterol 4.5 MICROgram(s) Inhaler 2 Puff(s) Inhalation two times a day  furosemide   Injectable 40 milliGRAM(s) IV Push daily  guaiFENesin  milliGRAM(s) Oral every 12 hours  influenza  Vaccine (HIGH DOSE) 0.7 milliLiter(s) IntraMuscular once  sodium chloride 0.9% lock flush 3 milliLiter(s) IV Push every 8 hours    MEDICATIONS  (PRN):  acetaminophen     Tablet .. 650 milliGRAM(s) Oral every 6 hours PRN Temp greater or equal to 38C (100.4F), Mild Pain (1 - 3)  albuterol/ipratropium for Nebulization 3 milliLiter(s) Nebulizer every 6 hours PRN Bronchospasm  aluminum hydroxide/magnesium hydroxide/simethicone Suspension 30 milliLiter(s) Oral every 4 hours PRN Dyspepsia  diltiazem Injectable 10 milliGRAM(s) IV Push every 6 hours PRN for heart rate above 110 bpm  melatonin 3 milliGRAM(s) Oral at bedtime PRN Insomnia  ondansetron Injectable 4 milliGRAM(s) IV Push every 8 hours PRN Nausea and/or Vomiting      I&O's Summary    07 Jan 2024 07:01  -  08 Jan 2024 07:00  --------------------------------------------------------  IN: 1320 mL / OUT: 400 mL / NET: 920 mL        PHYSICAL EXAM:  Vital Signs Last 24 Hrs  T(C): 36.7 (08 Jan 2024 12:00), Max: 36.7 (07 Jan 2024 19:05)  T(F): 98 (08 Jan 2024 12:00), Max: 98 (07 Jan 2024 19:05)  HR: 68 (08 Jan 2024 12:00) (64 - 106)  BP: 108/66 (08 Jan 2024 12:00) (103/65 - 132/82)  BP(mean): --  RR: 17 (08 Jan 2024 12:00) (17 - 18)  SpO2: 92% (08 Jan 2024 12:00) (91% - 96%)    Parameters below as of 08 Jan 2024 12:00  Patient On (Oxygen Delivery Method): nasal cannula  O2 Flow (L/min): 2        GENERAL: not in acute distress  HEENT:  Clear conjunctiva, PERRL, moist oral mucosa   RESP:  Non-labored breathing pattern, lungs clear to ausculation, no wheezes or crackles appreciated  CV: regular rate and rhythm, no murmurs appreciated, +1 lower extremity edema, peripheral pulses are 2+ bilaterally  GI: Soft, non-tender, non-distended  NEURO: Awake, alert, conversant, upper and lower extremity strength grossly intact  PSYCH: Calm, cooperative, A&Ox3  SKIN: No rash or lesions, warm and dry      LABS:                        13.0   7.55  )-----------( 247      ( 07 Jan 2024 05:16 )             41.5     01-07    139  |  100  |  18.7  ----------------------------<  127<H>  4.1   |  29.0  |  0.82    Ca    9.1      07 Jan 2024 05:16  Mg     2.3     01-07    TPro  6.7  /  Alb  3.8  /  TBili  0.8  /  DBili  x   /  AST  17  /  ALT  39  /  AlkPhos  56  01-07    PTT - ( 08 Jan 2024 07:48 )  PTT:78.6 sec      Urinalysis Basic - ( 07 Jan 2024 05:16 )    Color: x / Appearance: x / SG: x / pH: x  Gluc: 127 mg/dL / Ketone: x  / Bili: x / Urobili: x   Blood: x / Protein: x / Nitrite: x   Leuk Esterase: x / RBC: x / WBC x   Sq Epi: x / Non Sq Epi: x / Bacteria: x        CAPILLARY BLOOD GLUCOSE          IMAGING:                                   [facility]    SUBJECTIVE / OVERNIGHT EVENTS: Pt seen at the bedside. S/p LAN w/ cardioversion today. Now in normal sinus rhythm and reports feeling much better. Patient denies chest pain, SOB, abd pain, N/V, fever, chills, dysuria or any other complaints. All remainder ROS negative.     MEDICATIONS  (STANDING):  apixaban 5 milliGRAM(s) Oral every 12 hours  azithromycin  IVPB 500 milliGRAM(s) IV Intermittent every 24 hours  azithromycin  IVPB      budesonide 160 MICROgram(s)/formoterol 4.5 MICROgram(s) Inhaler 2 Puff(s) Inhalation two times a day  furosemide   Injectable 40 milliGRAM(s) IV Push daily  guaiFENesin  milliGRAM(s) Oral every 12 hours  influenza  Vaccine (HIGH DOSE) 0.7 milliLiter(s) IntraMuscular once  sodium chloride 0.9% lock flush 3 milliLiter(s) IV Push every 8 hours    MEDICATIONS  (PRN):  acetaminophen     Tablet .. 650 milliGRAM(s) Oral every 6 hours PRN Temp greater or equal to 38C (100.4F), Mild Pain (1 - 3)  albuterol/ipratropium for Nebulization 3 milliLiter(s) Nebulizer every 6 hours PRN Bronchospasm  aluminum hydroxide/magnesium hydroxide/simethicone Suspension 30 milliLiter(s) Oral every 4 hours PRN Dyspepsia  diltiazem Injectable 10 milliGRAM(s) IV Push every 6 hours PRN for heart rate above 110 bpm  melatonin 3 milliGRAM(s) Oral at bedtime PRN Insomnia  ondansetron Injectable 4 milliGRAM(s) IV Push every 8 hours PRN Nausea and/or Vomiting      I&O's Summary    07 Jan 2024 07:01  -  08 Jan 2024 07:00  --------------------------------------------------------  IN: 1320 mL / OUT: 400 mL / NET: 920 mL        PHYSICAL EXAM:  Vital Signs Last 24 Hrs  T(C): 36.7 (08 Jan 2024 12:00), Max: 36.7 (07 Jan 2024 19:05)  T(F): 98 (08 Jan 2024 12:00), Max: 98 (07 Jan 2024 19:05)  HR: 68 (08 Jan 2024 12:00) (64 - 106)  BP: 108/66 (08 Jan 2024 12:00) (103/65 - 132/82)  BP(mean): --  RR: 17 (08 Jan 2024 12:00) (17 - 18)  SpO2: 92% (08 Jan 2024 12:00) (91% - 96%)    Parameters below as of 08 Jan 2024 12:00  Patient On (Oxygen Delivery Method): nasal cannula  O2 Flow (L/min): 2        GENERAL: not in acute distress  HEENT:  Clear conjunctiva, PERRL, moist oral mucosa   RESP:  Non-labored breathing pattern, lungs clear to ausculation, no wheezes or crackles appreciated  CV: regular rate and rhythm, no murmurs appreciated, +1 lower extremity edema, peripheral pulses are 2+ bilaterally  GI: Soft, non-tender, non-distended  NEURO: Awake, alert, conversant, upper and lower extremity strength grossly intact  PSYCH: Calm, cooperative, A&Ox3  SKIN: No rash or lesions, warm and dry      LABS:                        13.0   7.55  )-----------( 247      ( 07 Jan 2024 05:16 )             41.5     01-07    139  |  100  |  18.7  ----------------------------<  127<H>  4.1   |  29.0  |  0.82    Ca    9.1      07 Jan 2024 05:16  Mg     2.3     01-07    TPro  6.7  /  Alb  3.8  /  TBili  0.8  /  DBili  x   /  AST  17  /  ALT  39  /  AlkPhos  56  01-07    PTT - ( 08 Jan 2024 07:48 )  PTT:78.6 sec      Urinalysis Basic - ( 07 Jan 2024 05:16 )    Color: x / Appearance: x / SG: x / pH: x  Gluc: 127 mg/dL / Ketone: x  / Bili: x / Urobili: x   Blood: x / Protein: x / Nitrite: x   Leuk Esterase: x / RBC: x / WBC x   Sq Epi: x / Non Sq Epi: x / Bacteria: x        CAPILLARY BLOOD GLUCOSE          IMAGING:                                   to be in no acute distress. Affect is appropriate.  Alert and oriented ×3  HEENT: Bilateral tympanic membranes are clear. Throat and oropharynx normal.  NECK: Supple, without palpable adenopathy. No audible carotid bruits   LUNGS: Clear to auscultation bilaterally.   CARDIOVASCULAR: Regular rate and rhythm.  SKIN: Skin color, texture, turgor are normal. There are no bruises, rashes or lesions.    ASSESSMENT/PLAN:  Patient is morbidly obese but is unable to afford the [facility] weight loss program. I did give him a name of a book that he may utilize to help with weight loss endeavors. His blood pressure and cholesterol are under good control. We'll continue his current medications for now. I did advise him to return in approximately 3-6 months for a Medicare wellness visit and see reaches age 67 and gets Medicare. He does not need any further lab work at this time we can discuss any additional lab work and any appropriate screening test for him at his upcoming Medicare wellness visit. Patient voices understanding and agreement with this plan.

## 2024-01-08 NOTE — DISCHARGE NOTE PROVIDER - NSDCFUSCHEDAPPT_GEN_ALL_CORE_FT
Shaka Olmstead  [facility]  [facility] [address]  Scheduled Appointment: 02/08/2024     Shaka Olmstead  [facility]  [facility] [address]  Scheduled Appointment: 02/08/2024

## 2024-01-08 NOTE — DISCHARGE NOTE PROVIDER - NSDCMRMEDTOKEN_GEN_ALL_CORE_FT
Advair Diskus 250 mcg-50 mcg inhalation powder: 1 inhaled 2 times a day  doxycycline hyclate 100 mg oral tablet: 1 tab(s) orally 2 times a day  Ventolin 90 mcg/inh inhalation aerosol: inhaled 4 times a day as needed for  shortness of breath and/or wheezing   Advair Diskus 250 mcg-50 mcg inhalation powder: 1 inhaled 2 times a day  apixaban 5 mg oral tablet: 1 tab(s) orally every 12 hours  Cardizem  mg/24 hours oral capsule, extended release: 1 cap(s) orally once a day  Lasix 40 mg oral tablet: 1 tab(s) orally once a day  Ventolin 90 mcg/inh inhalation aerosol: inhaled 4 times a day as needed for  shortness of breath and/or wheezing   Advair Diskus 250 mcg-50 mcg inhalation powder: 1 inhaled 2 times a day  apixaban 5 mg oral tablet: 1 tab(s) orally every 12 hours  Cardizem  mg/24 hours oral capsule, extended release: 1 cap(s) orally once a day  Lasix 40 mg oral tablet: 1 tab(s) orally once a day  Symbicort 80 mcg-4.5 mcg/inh inhalation aerosol: 2 puff(s) inhaled 2 times a day   Advair Diskus 250 mcg-50 mcg inhalation powder: 1 inhaled 2 times a day  apixaban 5 mg oral tablet: 1 tab(s) orally every 12 hours  Cardizem  mg/24 hours oral capsule, extended release: 1 cap(s) orally once a day  dilTIAZem 120 mg/24 hours oral tablet, extended release: 0.5 tab(s) orally once a day  Lasix 40 mg oral tablet: 1 tab(s) orally once a day  Symbicort 80 mcg-4.5 mcg/inh inhalation aerosol: 2 puff(s) inhaled 2 times a day

## 2024-01-08 NOTE — DISCHARGE NOTE PROVIDER - NSDCCPCAREPLAN_GEN_ALL_CORE_FT
PRINCIPAL DISCHARGE DIAGNOSIS  Diagnosis: Typical atrial flutter  Assessment and Plan of Treatment:      PRINCIPAL DISCHARGE DIAGNOSIS  Diagnosis: Typical atrial flutter  Assessment and Plan of Treatment: Continue all cardiac medications as prescribed and follow up with cardiology and EP within 1 week. Pulmonary follow up made for 2/8. If symptoms such as palpitations, shortness of breath and/or chest pain occur please go to nearest ED

## 2024-01-09 ENCOUNTER — TRANSCRIPTION ENCOUNTER (OUTPATIENT)
Age: 69
End: 2024-01-09

## 2024-01-09 VITALS
HEART RATE: 74 BPM | TEMPERATURE: 98 F | DIASTOLIC BLOOD PRESSURE: 72 MMHG | SYSTOLIC BLOOD PRESSURE: 133 MMHG | RESPIRATION RATE: 18 BRPM | OXYGEN SATURATION: 95 %

## 2024-01-09 LAB
APTT BLD: 34.3 SEC — SIGNIFICANT CHANGE UP (ref 24.5–35.6)
APTT BLD: 34.3 SEC — SIGNIFICANT CHANGE UP (ref 24.5–35.6)

## 2024-01-09 PROCEDURE — 85730 THROMBOPLASTIN TIME PARTIAL: CPT

## 2024-01-09 PROCEDURE — 84100 ASSAY OF PHOSPHORUS: CPT

## 2024-01-09 PROCEDURE — 0225U NFCT DS DNA&RNA 21 SARSCOV2: CPT

## 2024-01-09 PROCEDURE — 86900 BLOOD TYPING SEROLOGIC ABO: CPT

## 2024-01-09 PROCEDURE — 85027 COMPLETE CBC AUTOMATED: CPT

## 2024-01-09 PROCEDURE — 85610 PROTHROMBIN TIME: CPT

## 2024-01-09 PROCEDURE — 99285 EMERGENCY DEPT VISIT HI MDM: CPT

## 2024-01-09 PROCEDURE — 86803 HEPATITIS C AB TEST: CPT

## 2024-01-09 PROCEDURE — 96375 TX/PRO/DX INJ NEW DRUG ADDON: CPT

## 2024-01-09 PROCEDURE — 83735 ASSAY OF MAGNESIUM: CPT

## 2024-01-09 PROCEDURE — 93005 ELECTROCARDIOGRAM TRACING: CPT

## 2024-01-09 PROCEDURE — 80048 BASIC METABOLIC PNL TOTAL CA: CPT

## 2024-01-09 PROCEDURE — 93320 DOPPLER ECHO COMPLETE: CPT

## 2024-01-09 PROCEDURE — 86901 BLOOD TYPING SEROLOGIC RH(D): CPT

## 2024-01-09 PROCEDURE — 84484 ASSAY OF TROPONIN QUANT: CPT

## 2024-01-09 PROCEDURE — 93312 ECHO TRANSESOPHAGEAL: CPT

## 2024-01-09 PROCEDURE — 85379 FIBRIN DEGRADATION QUANT: CPT

## 2024-01-09 PROCEDURE — 94640 AIRWAY INHALATION TREATMENT: CPT

## 2024-01-09 PROCEDURE — 96374 THER/PROPH/DIAG INJ IV PUSH: CPT

## 2024-01-09 PROCEDURE — 85025 COMPLETE CBC W/AUTO DIFF WBC: CPT

## 2024-01-09 PROCEDURE — 99497 ADVNCD CARE PLAN 30 MIN: CPT

## 2024-01-09 PROCEDURE — C8929: CPT

## 2024-01-09 PROCEDURE — 93325 DOPPLER ECHO COLOR FLOW MAPG: CPT

## 2024-01-09 PROCEDURE — 36415 COLL VENOUS BLD VENIPUNCTURE: CPT

## 2024-01-09 PROCEDURE — 84443 ASSAY THYROID STIM HORMONE: CPT

## 2024-01-09 PROCEDURE — 86850 RBC ANTIBODY SCREEN: CPT

## 2024-01-09 PROCEDURE — 83880 ASSAY OF NATRIURETIC PEPTIDE: CPT

## 2024-01-09 PROCEDURE — 71045 X-RAY EXAM CHEST 1 VIEW: CPT

## 2024-01-09 PROCEDURE — 80053 COMPREHEN METABOLIC PANEL: CPT

## 2024-01-09 RX ORDER — APIXABAN 2.5 MG/1
1 TABLET, FILM COATED ORAL
Qty: 60 | Refills: 0
Start: 2024-01-09 | End: 2024-02-07

## 2024-01-09 RX ORDER — BUDESONIDE AND FORMOTEROL FUMARATE DIHYDRATE 160; 4.5 UG/1; UG/1
2 AEROSOL RESPIRATORY (INHALATION)
Qty: 1 | Refills: 0
Start: 2024-01-09 | End: 2024-02-07

## 2024-01-09 RX ORDER — DILTIAZEM HCL 120 MG
1 CAPSULE, EXT RELEASE 24 HR ORAL
Qty: 30 | Refills: 0
Start: 2024-01-09 | End: 2024-02-07

## 2024-01-09 RX ORDER — ALBUTEROL 90 UG/1
0 AEROSOL, METERED ORAL
Refills: 0 | DISCHARGE

## 2024-01-09 RX ORDER — FUROSEMIDE 40 MG
1 TABLET ORAL
Qty: 30 | Refills: 0
Start: 2024-01-09 | End: 2024-02-07

## 2024-01-09 RX ADMIN — APIXABAN 5 MILLIGRAM(S): 2.5 TABLET, FILM COATED ORAL at 11:00

## 2024-01-09 RX ADMIN — Medication 40 MILLIGRAM(S): at 05:13

## 2024-01-09 RX ADMIN — Medication 600 MILLIGRAM(S): at 05:13

## 2024-01-09 RX ADMIN — Medication 120 MILLIGRAM(S): at 05:13

## 2024-01-09 RX ADMIN — SODIUM CHLORIDE 3 MILLILITER(S): 9 INJECTION INTRAMUSCULAR; INTRAVENOUS; SUBCUTANEOUS at 05:10

## 2024-01-09 NOTE — PROGRESS NOTE ADULT - PROVIDER SPECIALTY LIST ADULT
Electrophysiology
Electrophysiology
Internal Medicine
Electrophysiology
Hospitalist
Hospitalist
Cardiology
Electrophysiology

## 2024-01-09 NOTE — PROGRESS NOTE ADULT - REASON FOR ADMISSION
new onset Afib/Flutter

## 2024-01-09 NOTE — DISCHARGE NOTE NURSING/CASE MANAGEMENT/SOCIAL WORK - NSDCVIVACCINE_GEN_ALL_CORE_FT
Tdap; 19-May-2023 14:13; Rodolfo Rushing (LPN); [employer]; [id number] (Exp. Date: 08-Mar-2025); IntraMuscular; Deltoid Left.; 0.5 milliLiter(s); VIS (VIS Published: 09-May-2013, VIS Presented: 19-May-2023);    Tdap; 19-May-2023 14:13; Rodolfo Rushing (LPN); [employer]; [id number] (Exp. Date: 08-Mar-2025); IntraMuscular; Deltoid Left.; 0.5 milliLiter(s); VIS (VIS Published: 09-May-2013, VIS Presented: 19-May-2023);

## 2024-01-09 NOTE — PROGRESS NOTE ADULT - SUBJECTIVE AND OBJECTIVE BOX
[facility], [facility]                              [address] [place] [address]                                                 PHONE: [phone]                                                 FAX: [phone]  -----------------------------------------------------------------------------------------------  Pt seen and examined. FU for  AF    Overnight events/Complaints: Pt without complains. Feels better. Reports some desat with walking. Does have O2 at home but has not used it in the past    Vital Signs Last 24 Hrs  T(C): 36.4 (09 Jan 2024 04:13), Max: 36.7 (08 Jan 2024 10:28)  T(F): 97.6 (09 Jan 2024 04:13), Max: 98.1 (08 Jan 2024 16:15)  HR: 63 (09 Jan 2024 04:13) (63 - 76)  BP: 123/75 (09 Jan 2024 04:13) (103/65 - 144/79)  BP(mean): --  RR: 18 (09 Jan 2024 04:13) (17 - 18)  SpO2: 97% (09 Jan 2024 04:13) (92% - 97%)    Parameters below as of 09 Jan 2024 08:11  Patient On (Oxygen Delivery Method): nasal cannula, 2L      I&O's Summary    08 Jan 2024 07:01  -  09 Jan 2024 07:00  --------------------------------------------------------  IN: 240 mL / OUT: 0 mL / NET: 240 mL        RELEVANT PHYSICAL EXAM:  Neck: No obvious JVD  Cardiovascular: regular S1, S2  Respiratory: Lungs clear to auscultation; no crepitations, no wheeze  Musculoskeletal: No edema        LABS:      PTT - ( 09 Jan 2024 07:04 )  PTT:34.3 sec    RADIOLOGY & ADDITIONAL STUDIES: (reviewed)  CXR was independently visualized/reviewed  and demonstrated: Heart magnified by AP film shallow inspiration. Bibasilar atelectasis   trace bibasilar effusions. Correlate clinically for infection      CARDIOLOGY TESTING:(reviewed)     12 lead EKG independently visualized/reviewed  and demonstrated NSR    ECHOCARDIOGRAM independently visualized/reviewed and demonstrated :    1. LAN with DCCV at 250J. Currently in NSR 60's.   2. Mildly enlarged right ventricular cavity size and mildly reduced systolic function.   3. No evidence of an interatrial septal aneurysm.   4. No left atrial thrombus.    TELEMETRY independently visualized/reviewed and demonstrated : PAF    MEDICATIONS:(reviewed)  MEDICATIONS  (STANDING):  apixaban 5 milliGRAM(s) Oral every 12 hours  azithromycin  IVPB      azithromycin  IVPB 500 milliGRAM(s) IV Intermittent every 24 hours  budesonide 160 MICROgram(s)/formoterol 4.5 MICROgram(s) Inhaler 2 Puff(s) Inhalation two times a day  diltiazem    milliGRAM(s) Oral daily  furosemide   Injectable 40 milliGRAM(s) IV Push daily  guaiFENesin  milliGRAM(s) Oral every 12 hours  influenza  Vaccine (HIGH DOSE) 0.7 milliLiter(s) IntraMuscular once  sodium chloride 0.9% lock flush 3 milliLiter(s) IV Push every 8 hours      ASSESSMENT AND PLAN:    68y Male PMHX of SVT who presented [facility] ED with palpitations.  Pt found to be in SVT.     AFib/AFL  - s/p LAN guided CVN in NSR  - Continue Eliquis  - Continue cardizem   - Continue lasix 40 mg daily on discharge    PNA  -abx as per primary team    No further inpt cardiac work-up needed. Pls recall if any qns/concerns.     Will arrange outpt FU post discharge.    Additional history obtained from  wife  [independent historian] and plan of care discussed at bedside                                                          [facility], [facility]                              [address] [place] [address]                                                 PHONE: [phone]                                                 FAX: [phone]  -----------------------------------------------------------------------------------------------  Pt seen and examined. FU for  AF    Overnight events/Complaints: Pt without complains. Feels better. Reports some desat with walking. Does have O2 at home but has not used it in the past    Vital Signs Last 24 Hrs  T(C): 36.4 (09 Jan 2024 04:13), Max: 36.7 (08 Jan 2024 10:28)  T(F): 97.6 (09 Jan 2024 04:13), Max: 98.1 (08 Jan 2024 16:15)  HR: 63 (09 Jan 2024 04:13) (63 - 76)  BP: 123/75 (09 Jan 2024 04:13) (103/65 - 144/79)  BP(mean): --  RR: 18 (09 Jan 2024 04:13) (17 - 18)  SpO2: 97% (09 Jan 2024 04:13) (92% - 97%)    Parameters below as of 09 Jan 2024 08:11  Patient On (Oxygen Delivery Method): nasal cannula, 2L      I&O's Summary    08 Jan 2024 07:01  -  09 Jan 2024 07:00  --------------------------------------------------------  IN: 240 mL / OUT: 0 mL / NET: 240 mL        RELEVANT PHYSICAL EXAM:  Neck: No obvious JVD  Cardiovascular: regular S1, S2  Respiratory: Lungs clear to auscultation; no crepitations, no wheeze  Musculoskeletal: No edema        LABS:      PTT - ( 09 Jan 2024 07:04 )  PTT:34.3 sec    RADIOLOGY & ADDITIONAL STUDIES: (reviewed)  CXR was independently visualized/reviewed  and demonstrated: Heart magnified by AP film shallow inspiration. Bibasilar atelectasis   trace bibasilar effusions. Correlate clinically for infection      CARDIOLOGY TESTING:(reviewed)     12 lead EKG independently visualized/reviewed  and demonstrated NSR    ECHOCARDIOGRAM independently visualized/reviewed and demonstrated :    1. LAN with DCCV at 250J. Currently in NSR 60's.   2. Mildly enlarged right ventricular cavity size and mildly reduced systolic function.   3. No evidence of an interatrial septal aneurysm.   4. No left atrial thrombus.    TELEMETRY independently visualized/reviewed and demonstrated : PAF    MEDICATIONS:(reviewed)  MEDICATIONS  (STANDING):  apixaban 5 milliGRAM(s) Oral every 12 hours  azithromycin  IVPB      azithromycin  IVPB 500 milliGRAM(s) IV Intermittent every 24 hours  budesonide 160 MICROgram(s)/formoterol 4.5 MICROgram(s) Inhaler 2 Puff(s) Inhalation two times a day  diltiazem    milliGRAM(s) Oral daily  furosemide   Injectable 40 milliGRAM(s) IV Push daily  guaiFENesin  milliGRAM(s) Oral every 12 hours  influenza  Vaccine (HIGH DOSE) 0.7 milliLiter(s) IntraMuscular once  sodium chloride 0.9% lock flush 3 milliLiter(s) IV Push every 8 hours      ASSESSMENT AND PLAN:    68y Male PMHX of SVT who presented to [facility] ED with palpitations.  Pt found to be in SVT.     AFib/AFL  - s/p LAN guided CVN in NSR  - Continue Eliquis  - Continue cardizem   - Continue lasix 40 mg daily on discharge    PNA  -abx as per primary team    No further inpt cardiac work-up needed. Pls recall if any qns/concerns.     Will arrange outpt FU post discharge.    Additional history obtained from  wife  [independent historian] and plan of care discussed at bedside

## 2024-01-09 NOTE — DISCHARGE NOTE NURSING/CASE MANAGEMENT/SOCIAL WORK - NSDCPEFALRISK_GEN_ALL_CORE
For information on Fall & Injury Prevention, visit: [email or web address] OR  [email or web address] OR  https://www.cdc.gov/steadi/patient.html For information on Fall & Injury Prevention, visit: [email or web address] OR  [email or web address] OR  https://www.cdc.gov/steadi/patient.html

## 2024-01-09 NOTE — DISCHARGE NOTE NURSING/CASE MANAGEMENT/SOCIAL WORK - PATIENT PORTAL LINK FT
You can access the FollowMyHealth Patient Portal offered by [facility] by registering at the following website: [email or web address]. By joining [employer]’s FollowMyHealth portal, you will also be able to view your health information using other applications (apps) compatible with our system. You can access the FollowMyHealth Patient Portal offered by [facility] by registering at the following website: [email or web address]. By joining [employer]’s FollowMyHealth portal, you will also be able to view your health information using other applications (apps) compatible with our system.

## 2024-01-11 RX ORDER — DILTIAZEM HCL 120 MG
0.5 CAPSULE, EXT RELEASE 24 HR ORAL
Qty: 15 | Refills: 0
Start: 2024-01-11 | End: 2024-02-09

## 2024-01-13 ENCOUNTER — EMERGENCY (EMERGENCY)
Facility: HOSPITAL | Age: 69
LOS: 1 days | Discharge: HOME | End: 2024-01-13
Attending: EMERGENCY MEDICINE
Payer: COMMERCIAL

## 2024-01-13 VITALS
TEMPERATURE: 98 F | HEART RATE: 61 BPM | DIASTOLIC BLOOD PRESSURE: 84 MMHG | SYSTOLIC BLOOD PRESSURE: 132 MMHG | OXYGEN SATURATION: 99 % | RESPIRATION RATE: 18 BRPM

## 2024-01-13 VITALS
HEART RATE: 87 BPM | TEMPERATURE: 98 F | HEIGHT: 68 IN | WEIGHT: 297.62 LBS | SYSTOLIC BLOOD PRESSURE: 143 MMHG | DIASTOLIC BLOOD PRESSURE: 86 MMHG | RESPIRATION RATE: 18 BRPM | OXYGEN SATURATION: 88 %

## 2024-01-13 LAB
ALBUMIN SERPL ELPH-MCNC: 4.1 G/DL — SIGNIFICANT CHANGE UP (ref 3.3–5.2)
ALBUMIN SERPL ELPH-MCNC: 4.1 G/DL — SIGNIFICANT CHANGE UP (ref 3.3–5.2)
ALP SERPL-CCNC: 76 U/L — SIGNIFICANT CHANGE UP (ref 40–120)
ALP SERPL-CCNC: 76 U/L — SIGNIFICANT CHANGE UP (ref 40–120)
ALT FLD-CCNC: 43 U/L — HIGH
ALT FLD-CCNC: 43 U/L — HIGH
ANION GAP SERPL CALC-SCNC: 9 MMOL/L — SIGNIFICANT CHANGE UP (ref 5–17)
ANION GAP SERPL CALC-SCNC: 9 MMOL/L — SIGNIFICANT CHANGE UP (ref 5–17)
APPEARANCE UR: CLEAR — SIGNIFICANT CHANGE UP
APPEARANCE UR: CLEAR — SIGNIFICANT CHANGE UP
AST SERPL-CCNC: 21 U/L — SIGNIFICANT CHANGE UP
AST SERPL-CCNC: 21 U/L — SIGNIFICANT CHANGE UP
BACTERIA # UR AUTO: NEGATIVE /HPF — SIGNIFICANT CHANGE UP
BACTERIA # UR AUTO: NEGATIVE /HPF — SIGNIFICANT CHANGE UP
BASOPHILS # BLD AUTO: 0.02 K/UL — SIGNIFICANT CHANGE UP (ref 0–0.2)
BASOPHILS # BLD AUTO: 0.02 K/UL — SIGNIFICANT CHANGE UP (ref 0–0.2)
BASOPHILS NFR BLD AUTO: 0.4 % — SIGNIFICANT CHANGE UP (ref 0–2)
BASOPHILS NFR BLD AUTO: 0.4 % — SIGNIFICANT CHANGE UP (ref 0–2)
BILIRUB SERPL-MCNC: 1.6 MG/DL — SIGNIFICANT CHANGE UP (ref 0.4–2)
BILIRUB SERPL-MCNC: 1.6 MG/DL — SIGNIFICANT CHANGE UP (ref 0.4–2)
BILIRUB UR-MCNC: NEGATIVE — SIGNIFICANT CHANGE UP
BILIRUB UR-MCNC: NEGATIVE — SIGNIFICANT CHANGE UP
BLD GP AB SCN SERPL QL: SIGNIFICANT CHANGE UP
BLD GP AB SCN SERPL QL: SIGNIFICANT CHANGE UP
BUN SERPL-MCNC: 18.4 MG/DL — SIGNIFICANT CHANGE UP (ref 8–20)
BUN SERPL-MCNC: 18.4 MG/DL — SIGNIFICANT CHANGE UP (ref 8–20)
CALCIUM SERPL-MCNC: 9.4 MG/DL — SIGNIFICANT CHANGE UP (ref 8.4–10.5)
CALCIUM SERPL-MCNC: 9.4 MG/DL — SIGNIFICANT CHANGE UP (ref 8.4–10.5)
CAST: 0 /LPF — SIGNIFICANT CHANGE UP (ref 0–4)
CAST: 0 /LPF — SIGNIFICANT CHANGE UP (ref 0–4)
CHLORIDE SERPL-SCNC: 95 MMOL/L — LOW (ref 96–108)
CHLORIDE SERPL-SCNC: 95 MMOL/L — LOW (ref 96–108)
CO2 SERPL-SCNC: 33 MMOL/L — HIGH (ref 22–29)
CO2 SERPL-SCNC: 33 MMOL/L — HIGH (ref 22–29)
COLOR SPEC: YELLOW — SIGNIFICANT CHANGE UP
COLOR SPEC: YELLOW — SIGNIFICANT CHANGE UP
CREAT SERPL-MCNC: 0.82 MG/DL — SIGNIFICANT CHANGE UP (ref 0.5–1.3)
CREAT SERPL-MCNC: 0.82 MG/DL — SIGNIFICANT CHANGE UP (ref 0.5–1.3)
DIFF PNL FLD: NEGATIVE — SIGNIFICANT CHANGE UP
DIFF PNL FLD: NEGATIVE — SIGNIFICANT CHANGE UP
EGFR: 97 ML/MIN/1.73M2 — SIGNIFICANT CHANGE UP
EGFR: 97 ML/MIN/1.73M2 — SIGNIFICANT CHANGE UP
EOSINOPHIL # BLD AUTO: 0.09 K/UL — SIGNIFICANT CHANGE UP (ref 0–0.5)
EOSINOPHIL # BLD AUTO: 0.09 K/UL — SIGNIFICANT CHANGE UP (ref 0–0.5)
EOSINOPHIL NFR BLD AUTO: 1.6 % — SIGNIFICANT CHANGE UP (ref 0–6)
EOSINOPHIL NFR BLD AUTO: 1.6 % — SIGNIFICANT CHANGE UP (ref 0–6)
GLUCOSE SERPL-MCNC: 109 MG/DL — HIGH (ref 70–99)
GLUCOSE SERPL-MCNC: 109 MG/DL — HIGH (ref 70–99)
GLUCOSE UR QL: NEGATIVE MG/DL — SIGNIFICANT CHANGE UP
GLUCOSE UR QL: NEGATIVE MG/DL — SIGNIFICANT CHANGE UP
HCT VFR BLD CALC: 47.2 % — SIGNIFICANT CHANGE UP (ref 39–50)
HCT VFR BLD CALC: 47.2 % — SIGNIFICANT CHANGE UP (ref 39–50)
HGB BLD-MCNC: 15.9 G/DL — SIGNIFICANT CHANGE UP (ref 13–17)
HGB BLD-MCNC: 15.9 G/DL — SIGNIFICANT CHANGE UP (ref 13–17)
IMM GRANULOCYTES NFR BLD AUTO: 0.4 % — SIGNIFICANT CHANGE UP (ref 0–0.9)
IMM GRANULOCYTES NFR BLD AUTO: 0.4 % — SIGNIFICANT CHANGE UP (ref 0–0.9)
KETONES UR-MCNC: NEGATIVE MG/DL — SIGNIFICANT CHANGE UP
KETONES UR-MCNC: NEGATIVE MG/DL — SIGNIFICANT CHANGE UP
LEUKOCYTE ESTERASE UR-ACNC: ABNORMAL
LEUKOCYTE ESTERASE UR-ACNC: ABNORMAL
LIDOCAIN IGE QN: 46 U/L — SIGNIFICANT CHANGE UP (ref 22–51)
LIDOCAIN IGE QN: 46 U/L — SIGNIFICANT CHANGE UP (ref 22–51)
LYMPHOCYTES # BLD AUTO: 1.31 K/UL — SIGNIFICANT CHANGE UP (ref 1–3.3)
LYMPHOCYTES # BLD AUTO: 1.31 K/UL — SIGNIFICANT CHANGE UP (ref 1–3.3)
LYMPHOCYTES # BLD AUTO: 23.3 % — SIGNIFICANT CHANGE UP (ref 13–44)
LYMPHOCYTES # BLD AUTO: 23.3 % — SIGNIFICANT CHANGE UP (ref 13–44)
MCHC RBC-ENTMCNC: 31.5 PG — SIGNIFICANT CHANGE UP (ref 27–34)
MCHC RBC-ENTMCNC: 31.5 PG — SIGNIFICANT CHANGE UP (ref 27–34)
MCHC RBC-ENTMCNC: 33.7 GM/DL — SIGNIFICANT CHANGE UP (ref 32–36)
MCHC RBC-ENTMCNC: 33.7 GM/DL — SIGNIFICANT CHANGE UP (ref 32–36)
MCV RBC AUTO: 93.5 FL — SIGNIFICANT CHANGE UP (ref 80–100)
MCV RBC AUTO: 93.5 FL — SIGNIFICANT CHANGE UP (ref 80–100)
MONOCYTES # BLD AUTO: 0.46 K/UL — SIGNIFICANT CHANGE UP (ref 0–0.9)
MONOCYTES # BLD AUTO: 0.46 K/UL — SIGNIFICANT CHANGE UP (ref 0–0.9)
MONOCYTES NFR BLD AUTO: 8.2 % — SIGNIFICANT CHANGE UP (ref 2–14)
MONOCYTES NFR BLD AUTO: 8.2 % — SIGNIFICANT CHANGE UP (ref 2–14)
NEUTROPHILS # BLD AUTO: 3.73 K/UL — SIGNIFICANT CHANGE UP (ref 1.8–7.4)
NEUTROPHILS # BLD AUTO: 3.73 K/UL — SIGNIFICANT CHANGE UP (ref 1.8–7.4)
NEUTROPHILS NFR BLD AUTO: 66.1 % — SIGNIFICANT CHANGE UP (ref 43–77)
NEUTROPHILS NFR BLD AUTO: 66.1 % — SIGNIFICANT CHANGE UP (ref 43–77)
NITRITE UR-MCNC: NEGATIVE — SIGNIFICANT CHANGE UP
NITRITE UR-MCNC: NEGATIVE — SIGNIFICANT CHANGE UP
PH UR: 8.5 (ref 5–8)
PH UR: 8.5 (ref 5–8)
PLATELET # BLD AUTO: 240 K/UL — SIGNIFICANT CHANGE UP (ref 150–400)
PLATELET # BLD AUTO: 240 K/UL — SIGNIFICANT CHANGE UP (ref 150–400)
POTASSIUM SERPL-MCNC: 4.3 MMOL/L — SIGNIFICANT CHANGE UP (ref 3.5–5.3)
POTASSIUM SERPL-MCNC: 4.3 MMOL/L — SIGNIFICANT CHANGE UP (ref 3.5–5.3)
POTASSIUM SERPL-SCNC: 4.3 MMOL/L — SIGNIFICANT CHANGE UP (ref 3.5–5.3)
POTASSIUM SERPL-SCNC: 4.3 MMOL/L — SIGNIFICANT CHANGE UP (ref 3.5–5.3)
PROT SERPL-MCNC: 7.6 G/DL — SIGNIFICANT CHANGE UP (ref 6.6–8.7)
PROT SERPL-MCNC: 7.6 G/DL — SIGNIFICANT CHANGE UP (ref 6.6–8.7)
PROT UR-MCNC: NEGATIVE MG/DL — SIGNIFICANT CHANGE UP
PROT UR-MCNC: NEGATIVE MG/DL — SIGNIFICANT CHANGE UP
RBC # BLD: 5.05 M/UL — SIGNIFICANT CHANGE UP (ref 4.2–5.8)
RBC # BLD: 5.05 M/UL — SIGNIFICANT CHANGE UP (ref 4.2–5.8)
RBC # FLD: 13.2 % — SIGNIFICANT CHANGE UP (ref 10.3–14.5)
RBC # FLD: 13.2 % — SIGNIFICANT CHANGE UP (ref 10.3–14.5)
RBC CASTS # UR COMP ASSIST: 2 /HPF — SIGNIFICANT CHANGE UP (ref 0–4)
RBC CASTS # UR COMP ASSIST: 2 /HPF — SIGNIFICANT CHANGE UP (ref 0–4)
SODIUM SERPL-SCNC: 137 MMOL/L — SIGNIFICANT CHANGE UP (ref 135–145)
SODIUM SERPL-SCNC: 137 MMOL/L — SIGNIFICANT CHANGE UP (ref 135–145)
SP GR SPEC: 1.02 — SIGNIFICANT CHANGE UP (ref 1–1.03)
SP GR SPEC: 1.02 — SIGNIFICANT CHANGE UP (ref 1–1.03)
SQUAMOUS # UR AUTO: 0 /HPF — SIGNIFICANT CHANGE UP (ref 0–5)
SQUAMOUS # UR AUTO: 0 /HPF — SIGNIFICANT CHANGE UP (ref 0–5)
UROBILINOGEN FLD QL: 1 MG/DL — SIGNIFICANT CHANGE UP (ref 0.2–1)
UROBILINOGEN FLD QL: 1 MG/DL — SIGNIFICANT CHANGE UP (ref 0.2–1)
WBC # BLD: 5.63 K/UL — SIGNIFICANT CHANGE UP (ref 3.8–10.5)
WBC # BLD: 5.63 K/UL — SIGNIFICANT CHANGE UP (ref 3.8–10.5)
WBC # FLD AUTO: 5.63 K/UL — SIGNIFICANT CHANGE UP (ref 3.8–10.5)
WBC # FLD AUTO: 5.63 K/UL — SIGNIFICANT CHANGE UP (ref 3.8–10.5)
WBC UR QL: 0 /HPF — SIGNIFICANT CHANGE UP (ref 0–5)
WBC UR QL: 0 /HPF — SIGNIFICANT CHANGE UP (ref 0–5)

## 2024-01-13 PROCEDURE — 93005 ELECTROCARDIOGRAM TRACING: CPT

## 2024-01-13 PROCEDURE — 86900 BLOOD TYPING SEROLOGIC ABO: CPT

## 2024-01-13 PROCEDURE — 87086 URINE CULTURE/COLONY COUNT: CPT

## 2024-01-13 PROCEDURE — 85025 COMPLETE CBC W/AUTO DIFF WBC: CPT

## 2024-01-13 PROCEDURE — 36415 COLL VENOUS BLD VENIPUNCTURE: CPT

## 2024-01-13 PROCEDURE — 86850 RBC ANTIBODY SCREEN: CPT

## 2024-01-13 PROCEDURE — 86901 BLOOD TYPING SEROLOGIC RH(D): CPT

## 2024-01-13 PROCEDURE — 99284 EMERGENCY DEPT VISIT MOD MDM: CPT | Mod: 25

## 2024-01-13 PROCEDURE — 83690 ASSAY OF LIPASE: CPT

## 2024-01-13 PROCEDURE — 81001 URINALYSIS AUTO W/SCOPE: CPT

## 2024-01-13 PROCEDURE — 99285 EMERGENCY DEPT VISIT HI MDM: CPT | Mod: GC

## 2024-01-13 PROCEDURE — 93010 ELECTROCARDIOGRAM REPORT: CPT

## 2024-01-13 PROCEDURE — 80053 COMPREHEN METABOLIC PANEL: CPT

## 2024-01-13 NOTE — ED ADULT NURSE NOTE - CHIEF COMPLAINT QUOTE
Patient with recent cardioversion on eliquis now with abdominal cramping and blood in urine. on 2L O2 PRN

## 2024-01-13 NOTE — ED PROVIDER NOTE - CLINICAL SUMMARY MEDICAL DECISION MAKING FREE TEXT BOX
68 year old M PMHx asthma and possible GIOVANNI on PRN O2 2L at home, aflutter, SVT s/p LAN guided DCCV on Eliquis presents c/o bloody urine. Pt reports cramping b/l abdominal pain that began yesterday, waxing and waning, cramping pain band-like in LUQ and RLQ that has since gone away.     Pending labs, UA/UC, EKG. Likely UTI. 68 year old M PMHx asthma and possible GIOVANNI on PRN O2 2L at home, aflutter, SVT s/p LAN guided DCCV on Eliquis presents c/o bloody urine. Pt reports cramping b/l abdominal pain that began yesterday, waxing and waning, cramping pain band-like in LUQ and RLQ that has since gone away.     Pending labs, UA/UC, EKG. Likely UTI.    Pt's labs and urine are WNL, no blood in UA.    Conversation had with patient regarding results of testing. Patient agrees with plan for discharge at this time. Patient agrees to comply with follow up with urology. Return to ED precautions and discharge instructions given to patient.

## 2024-01-13 NOTE — ED PROVIDER NOTE - ATTENDING CONTRIBUTION TO CARE
I personally saw the patient with the resident, and completed the key components of the history and physical exam. I then discussed the management plan with the resident.    67 y/o M with elevated BMI, asthma on 2L NC, A flutter/SVT s/p cardioversion, now on Eliquis presents for bloody urine that was noticed today by his wife (patient is color blind and states "everything always looks green to me"). He had 2 episodes of abdominal cramping yesterday, lasting minutes that resolved with moving and stretching, no recurrence. Denies clots in urine, fever, back pain, dysuria.    PE - NAD, no pallor, RRR, no respiratory distress, satting well on home NC O2, abd soft, NT/ND, no guarding, trace b/l LE edema, 2+ symmetrical distal pulses.    labs, UA - will reassess I personally saw the patient with the resident, and completed the key components of the history and physical exam. I then discussed the management plan with the resident.    69 y/o M with elevated BMI, asthma on 2L NC, A flutter/SVT s/p cardioversion, now on Eliquis presents for bloody urine that was noticed today by his wife (patient is color blind and states "everything always looks green to me"). He had 2 episodes of abdominal cramping yesterday, lasting minutes that resolved with moving and stretching, no recurrence. Denies clots in urine, fever, back pain, dysuria.    PE - NAD, no pallor, RRR, no respiratory distress, satting well on home NC O2, abd soft, NT/ND, no guarding, trace b/l LE edema, 2+ symmetrical distal pulses.    labs, UA - will reassess

## 2024-01-13 NOTE — ED PROVIDER NOTE - PROVIDER TOKENS
PROVIDER:[TOKEN:[[id number]],FOLLOWUP:[Routine]] PROVIDER:[TOKEN:[[id number]],FOLLOWUP:[Routine]]

## 2024-01-13 NOTE — ED PROVIDER NOTE - NSFOLLOWUPINSTRUCTIONS_ED_ALL_ED_FT
1) Please follow-up with your urologist in the next 5-7 days.  Please call tomorrow for an appointment.  If you cannot follow-up with your urologist please return to the ED for any urgent issues.  2) You were given a copy of the tests performed today.  Please bring the results with you and review them with your urologist.  3) If you have any worsening of symptoms or any other concerns please return to the ED immediately.  4) Please continue taking your home medications as directed.

## 2024-01-13 NOTE — ED PROVIDER NOTE - CARE PROVIDER_API CALL
[facility] [place]  [facility]  [address] [place] [address]  Phone: [phone]  Fax: [phone]  Follow Up Time: Routine   [facility] [place]  [facility]  [address] [place] [address]  Phone: [phone]  Fax: [phone]  Follow Up Time: Routine

## 2024-01-13 NOTE — ED PROVIDER NOTE - CARE PROVIDERS DIRECT ADDRESSES
[email or web address] [place] [email or web address]

## 2024-01-13 NOTE — ED PROVIDER NOTE - PATIENT PORTAL LINK FT
You can access the FollowMyHealth Patient Portal offered by [facility] by registering at the following website: [email or web address]. By joining [employer]’s FollowMyHealth portal, you will also be able to view your health information using other applications (apps) compatible with our system. You can access the FollowMyHealth Patient Portal offered by [facility] by registering at the following website: [email or web address]. By joining [employer]’s FollowMyHealth portal, you will also be able to view your health information using other applications (apps) compatible with our system.

## 2024-01-13 NOTE — ED ADULT NURSE NOTE - NSFALLUNIVINTERV_ED_ALL_ED
Bed/Stretcher in lowest position, wheels locked, appropriate side rails in place/Call bell, personal items and telephone in reach/Instruct patient to call for assistance before getting out of bed/chair/stretcher/Non-slip footwear applied when patient is off stretcher/[place] to call system/Physically safe environment - no spills, clutter or unnecessary equipment/Purposeful proactive rounding/Room/bathroom lighting operational, light cord in reach Bed/Stretcher in lowest position, wheels locked, appropriate side rails in place/Call bell, personal items and telephone in reach/Instruct patient to call for assistance before getting out of bed/chair/stretcher/Non-slip footwear applied when patient is off stretcher/[place] to call system/Physically safe environment - no spills, clutter or unnecessary equipment/Purposeful proactive rounding/Room/bathroom lighting operational, light cord in reach

## 2024-01-13 NOTE — CONSULT NOTE ADULT - SUBJECTIVE AND OBJECTIVE BOX
[place] [facility] - [facility], THE HEART CENTER                                   [address] [place] [address]                                                      PHONE: [phone]                                                         FAX: [phone]  [email or web address]  ---------------------------------------------------------------------------------------------------------------------------------    Reason for Consult: Hematuria  [facility]: [place]  HPI:  YAMIL ANNE is an 68y Male PMhx Asthma, obesity, GIOVANNI not on CPAP, aw recent aflutter sp LAN/DCCV with restoration of NSR now with hematuria. Pt and wife at bedside. Hematuria was noticed today but unsure of the duration.    PAST MEDICAL & SURGICAL HISTORY:  Asthma      Obesity      No significant past surgical history          aspirin (Short breath)      MEDICATIONS  (STANDING):    MEDICATIONS  (PRN):      Social History:  Cigarettes:     no               Alchohol:     no            Illicit Drug Abuse:  no  FHx no SCD  ROS: Negative other than as mentioned in HPI.    Vital Signs Last 24 Hrs  T(C): 36.7 (13 Jan 2024 08:03), Max: 36.7 (13 Jan 2024 08:03)  T(F): 98 (13 Jan 2024 08:03), Max: 98 (13 Jan 2024 08:03)  HR: 87 (13 Jan 2024 08:03) (87 - 87)  BP: 143/86 (13 Jan 2024 08:03) (143/86 - 143/86)  BP(mean): --  RR: 18 (13 Jan 2024 08:03) (18 - 18)  SpO2: 88% (13 Jan 2024 08:03) (88% - 88%)    Parameters below as of 13 Jan 2024 08:03  Patient On (Oxygen Delivery Method): room air      ICU Vital Signs Last 24 Hrs  YAMIL ANNE  I&O's Detail    I&O's Summary    Drug Dosing Weight  YAMIL ANNE      PHYSICAL EXAM:  General:  alert and cooperative.  HEENT: Head; normocephalic, atraumatic.  Eyes: Pupils reactive, cornea wnl.  Neck: Supple, no nodes adenopathy, no NVD or carotid bruit or thyromegaly.  CARDIOVASCULAR: Normal S1 and S2, No murmur, rub, gallop or lift.   LUNGS: No rales, rhonchi or wheeze. Normal breath sounds bilaterally.  ABDOMEN: Soft, nontender without mass or organomegaly. bowel sounds normoactive.  EXTREMITIES: No clubbing, cyanosis or edema. Distal pulses wnl.   SKIN: warm and dry with normal turgor.  NEURO: Alert/oriented x 3/normal motor exam. No pathologic reflexes.    PSYCH: normal affect.        LABS:                        15.9   5.63  )-----------( 240      ( 13 Jan 2024 08:39 )             47.2     01-13    137  |  95<L>  |  18.4  ----------------------------<  109<H>  4.3   |  33.0<H>  |  0.82    Ca    9.4      13 Jan 2024 08:39    TPro  7.6  /  Alb  4.1  /  TBili  1.6  /  DBili  x   /  AST  21  /  ALT  43<H>  /  AlkPhos  76  01-13    YAMIL ANNE        Urinalysis Basic - ( 13 Jan 2024 08:39 )    Color: x / Appearance: x / SG: x / pH: x  Gluc: 109 mg/dL / Ketone: x  / Bili: x / Urobili: x   Blood: x / Protein: x / Nitrite: x   Leuk Esterase: x / RBC: x / WBC x   Sq Epi: x / Non Sq Epi: x / Bacteria: x        RADIOLOGY & ADDITIONAL STUDIES:    INTERPRETATION OF TELEMETRY (personally reviewed):    ECG:    ECHO: < from: LAN W or WO Ultrasound Enhancing Agent (01.08.24 @ 10:48) >  CONCLUSIONS:      1. LAN with DCCV at 250J. Currently in NSR 60's.   2. Mildly enlarged right ventricular cavity size and mildly reduced systolic function.   3. No evidence of an interatrial septal aneurysm.   4. No left atrial thrombus.    ____________________________________________________________________  LAN Procedure:  After discussion of the risks and benefits of the LAN, an informed consent was obtained. The patient tolerated the procedure well and without complications.  ________________________________________________________________________________________  FINDINGS:     Left Ventricle:  LAN with DCCV at 250J. Currently in NSR 60's.     Right Ventricle:  The right ventricular cavity is mildly enlarged in size and mildly reduced systolic function.     Left Atrium:  The left atrium is normal. There is no spontaneous echo contrast in the left atrial appendage. No echodensities observed in the right atrium and right atrial appendage.     Interatrial Septum:  There is no evidence of an interatrial septal aneurysm.     Mitral Valve:  There is mild to moderate mitral regurgitation.     Tricuspid Valve:  There is mild tricuspid regurgitation.     Pulmonic Valve:  There is trace pulmonic regurgitation.     Aorta:  The aortic annulus appears normal in size.  ____________________________________________________________________  QUANTITATIVE DATA:  Left Ventricle Measurements: (Indexed to BSA)     Visualized LV EF%: 50 to 55%       ________________________________________________________________________________________  Electronically signed on 1/8/2024 at 11:15:36 AM by Mariano Bearden MD       < end of copied text >         Assessment and Plan:  In summary, YAMIL ANNE is an 68y Male with PMhx Asthma, obesity, GIOVANNI not on CPAP, aw recent aflutter sp LAN/DCCV with restoration of NSR now with hematuria. Pt and wife at bedside. Hematuria was noticed today but unsure of the duration.    1) cw eliquis if no significant Hb drop  2) Will need likely outpt  evaluation  3) No need for cardiac admission at this time   4) Please recall as needed                 [place] [facility] - [facility], [facility]                                   [address] [place] [address]                                                      PHONE: [phone]                                                         FAX: [phone]  [email or web address]  ---------------------------------------------------------------------------------------------------------------------------------    Reason for Consult: Hematuria  CVS: [place]  HPI:  YAMIL ANNE is an 68y Male PMhx Asthma, obesity, GIOVANNI not on CPAP, aw recent aflutter sp LAN/DCCV with restoration of NSR now with hematuria. Pt and wife at bedside. Hematuria was noticed today but unsure of the duration.    PAST MEDICAL & SURGICAL HISTORY:  Asthma      Obesity      No significant past surgical history          aspirin (Short breath)      MEDICATIONS  (STANDING):    MEDICATIONS  (PRN):      Social History:  Cigarettes:     no               Alchohol:     no            Illicit Drug Abuse:  no  FHx no SCD  ROS: Negative other than as mentioned in HPI.    Vital Signs Last 24 Hrs  T(C): 36.7 (13 Jan 2024 08:03), Max: 36.7 (13 Jan 2024 08:03)  T(F): 98 (13 Jan 2024 08:03), Max: 98 (13 Jan 2024 08:03)  HR: 87 (13 Jan 2024 08:03) (87 - 87)  BP: 143/86 (13 Jan 2024 08:03) (143/86 - 143/86)  BP(mean): --  RR: 18 (13 Jan 2024 08:03) (18 - 18)  SpO2: 88% (13 Jan 2024 08:03) (88% - 88%)    Parameters below as of 13 Jan 2024 08:03  Patient On (Oxygen Delivery Method): room air      ICU Vital Signs Last 24 Hrs  YAMIL ANNE  I&O's Detail    I&O's Summary    Drug Dosing Weight  YAMIL ANNE      PHYSICAL EXAM:  General:  alert and cooperative.  HEENT: Head; normocephalic, atraumatic.  Eyes: Pupils reactive, cornea wnl.  Neck: Supple, no nodes adenopathy, no NVD or carotid bruit or thyromegaly.  CARDIOVASCULAR: Normal S1 and S2, No murmur, rub, gallop or lift.   LUNGS: No rales, rhonchi or wheeze. Normal breath sounds bilaterally.  ABDOMEN: Soft, nontender without mass or organomegaly. bowel sounds normoactive.  EXTREMITIES: No clubbing, cyanosis or edema. Distal pulses wnl.   SKIN: warm and dry with normal turgor.  NEURO: Alert/oriented x 3/normal motor exam. No pathologic reflexes.    PSYCH: normal affect.        LABS:                        15.9   5.63  )-----------( 240      ( 13 Jan 2024 08:39 )             47.2     01-13    137  |  95<L>  |  18.4  ----------------------------<  109<H>  4.3   |  33.0<H>  |  0.82    Ca    9.4      13 Jan 2024 08:39    TPro  7.6  /  Alb  4.1  /  TBili  1.6  /  DBili  x   /  AST  21  /  ALT  43<H>  /  AlkPhos  76  01-13    YAMIL ANNE        Urinalysis Basic - ( 13 Jan 2024 08:39 )    Color: x / Appearance: x / SG: x / pH: x  Gluc: 109 mg/dL / Ketone: x  / Bili: x / Urobili: x   Blood: x / Protein: x / Nitrite: x   Leuk Esterase: x / RBC: x / WBC x   Sq Epi: x / Non Sq Epi: x / Bacteria: x        RADIOLOGY & ADDITIONAL STUDIES:    INTERPRETATION OF TELEMETRY (personally reviewed):    ECG:    ECHO: < from: LAN W or WO Ultrasound Enhancing Agent (01.08.24 @ 10:48) >  CONCLUSIONS:      1. LAN with DCCV at 250J. Currently in NSR 60's.   2. Mildly enlarged right ventricular cavity size and mildly reduced systolic function.   3. No evidence of an interatrial septal aneurysm.   4. No left atrial thrombus.    ____________________________________________________________________  LAN Procedure:  After discussion of the risks and benefits of the LAN, an informed consent was obtained. The patient tolerated the procedure well and without complications.  ________________________________________________________________________________________  FINDINGS:     Left Ventricle:  LAN with DCCV at 250J. Currently in NSR 60's.     Right Ventricle:  The right ventricular cavity is mildly enlarged in size and mildly reduced systolic function.     Left Atrium:  The left atrium is normal. There is no spontaneous echo contrast in the left atrial appendage. No echodensities observed in the right atrium and right atrial appendage.     Interatrial Septum:  There is no evidence of an interatrial septal aneurysm.     Mitral Valve:  There is mild to moderate mitral regurgitation.     Tricuspid Valve:  There is mild tricuspid regurgitation.     Pulmonic Valve:  There is trace pulmonic regurgitation.     Aorta:  The aortic annulus appears normal in size.  ____________________________________________________________________  QUANTITATIVE DATA:  Left Ventricle Measurements: (Indexed to BSA)     Visualized LV EF%: 50 to 55%       ________________________________________________________________________________________  Electronically signed on 1/8/2024 at 11:15:36 AM by Mariano Bearden MD       < end of copied text >         Assessment and Plan:  In summary, YAMIL ANNE is an 68y Male with PMhx Asthma, obesity, GIOVANNI not on CPAP, aw recent aflutter sp LAN/DCCV with restoration of NSR now with hematuria. Pt and wife at bedside. Hematuria was noticed today but unsure of the duration.    1) cw eliquis if no significant Hb drop  2) Will need likely outpt  evaluation  3) No need for cardiac admission at this time   4) Please recall as needed

## 2024-01-13 NOTE — ED ADULT NURSE NOTE - OBJECTIVE STATEMENT
patient complains of hematuria with mild abdominal pain since early this am as claimed. patient is on eliquis and chronic o2 @ 2lpm.

## 2024-01-13 NOTE — ED PROVIDER NOTE - PHYSICAL EXAMINATION
General: Awake, alert, lying in bed in NAD on 2L O2 NC  HEENT: Normocephalic, atraumatic. No scleral icterus or conjunctival injection. EOMI. Moist mucous membranes. Oropharynx clear.   Neck:. Soft and supple.  Cardiac: RRR, Peripheral pulses 2+ and symmetric. No LE edema.  Resp: Wheezing throughout. No accessory muscle use  Abd: Soft, non-tender, non-distended. No guarding, rebound, or rigidity. No CVA tenderness.  Back: Spine midline and non-tender.   Skin: No rashes, abrasions, or lacerations.  Neuro: AO x 4. Moves all extremities symmetrically. Motor strength and sensation grossly intact.  Psych: Appropriate mood and affect

## 2024-01-13 NOTE — ED PROVIDER NOTE - OBJECTIVE STATEMENT
68 year old M PMHx asthma and possible GIOVANNI on PRN O2 2L at home, aflutter, SVT s/p LAN guided DCCV on Eliquis presents c/o bloody urine. Pt reports cramping b/l abdominal pain that began yesterday, waxing and waning, cramping pain band-like in LUQ and RLQ that has since gone away. Pt's partner at bedside reports pt having blood-tinged urine without clots yesterday and today. Denies fevers, chills, dizziness, LOC, HA, CP, SOB, abd pain, n/v/d/c, denies black stool, denies dysuria, denies LE swelling. Pt taking Eliquis, cardizem and lasix.  Allergy to aspirin.

## 2024-01-14 LAB
CULTURE RESULTS: SIGNIFICANT CHANGE UP
CULTURE RESULTS: SIGNIFICANT CHANGE UP
SPECIMEN SOURCE: SIGNIFICANT CHANGE UP
SPECIMEN SOURCE: SIGNIFICANT CHANGE UP

## 2024-02-08 ENCOUNTER — APPOINTMENT (OUTPATIENT)
Dept: PULMONOLOGY | Facility: CLINIC | Age: 69
End: 2024-02-08
Payer: COMMERCIAL

## 2024-02-08 VITALS
DIASTOLIC BLOOD PRESSURE: 60 MMHG | RESPIRATION RATE: 16 BRPM | OXYGEN SATURATION: 94 % | WEIGHT: 309 LBS | BODY MASS INDEX: 46.83 KG/M2 | HEIGHT: 68 IN | HEART RATE: 59 BPM | SYSTOLIC BLOOD PRESSURE: 102 MMHG

## 2024-02-08 DIAGNOSIS — J39.8 OTHER SPECIFIED DISEASES OF UPPER RESPIRATORY TRACT: ICD-10-CM

## 2024-02-08 DIAGNOSIS — R06.09 OTHER FORMS OF DYSPNEA: ICD-10-CM

## 2024-02-08 PROCEDURE — 99215 OFFICE O/P EST HI 40 MIN: CPT

## 2024-02-08 RX ORDER — ALBUTEROL SULFATE 90 UG/1
108 (90 BASE) AEROSOL, METERED RESPIRATORY (INHALATION)
Refills: 0 | Status: DISCONTINUED | COMMUNITY
End: 2024-02-08

## 2024-02-08 RX ORDER — BUDESONIDE AND FORMOTEROL FUMARATE DIHYDRATE 160; 4.5 UG/1; UG/1
160-4.5 AEROSOL RESPIRATORY (INHALATION)
Qty: 3 | Refills: 3 | Status: ACTIVE | COMMUNITY
Start: 2024-02-08 | End: 1900-01-01

## 2024-02-08 RX ORDER — FLUTICASONE PROPIONATE AND SALMETEROL 250; 50 UG/1; UG/1
250-50 POWDER RESPIRATORY (INHALATION) TWICE DAILY
Qty: 1 | Refills: 0 | Status: DISCONTINUED | COMMUNITY
Start: 2019-05-28 | End: 2024-02-08

## 2024-02-08 RX ORDER — APIXABAN 5 MG/1
5 TABLET, FILM COATED ORAL
Refills: 0 | Status: ACTIVE | COMMUNITY

## 2024-02-08 RX ORDER — FUROSEMIDE 20 MG/1
20 TABLET ORAL
Refills: 0 | Status: ACTIVE | COMMUNITY

## 2024-02-08 NOTE — PLAN
[TextEntry] : Continue symbicort.  Albuterol prn. Walter to be done. Repeat CT with dynamic protolcol. Weight loss a must.  Needs cpap treated. Reviewed treatments. Start with CPAP; will order CPAP study.  Reviewed with the patient the short and long term health consequences of untreated GIOVANNI. Risk include, but not limited to, HTN, heart disease, stroke, MVAs.

## 2024-02-08 NOTE — PROCEDURE
[FreeTextEntry1] : spirometry done 6/27/19 reviewed; severe obstruction\par  \par  CT chest done 5/28/19 reviewed

## 2024-02-08 NOTE — PHYSICAL EXAM
[General Appearance - In No Acute Distress] : no acute distress [Normal Conjunctiva] : the conjunctiva exhibited no abnormalities [Low Lying Soft Palate] : low lying soft palate [Elongated Uvula] : elongated uvula [Enlarged Base of the Tongue] : enlargement of the base of the tongue [III] : III [Neck Appearance] : the appearance of the neck was normal [] : the neck was supple [Neck Circumference: ___] : neck circumference is [unfilled] [Heart Rate And Rhythm] : heart rate and rhythm were normal [Heart Sounds] : normal S1 and S2 [Abdomen Soft] : soft [Bowel Sounds] : normal bowel sounds [Nail Clubbing] : no clubbing of the fingernails [Cyanosis, Localized] : no localized cyanosis [No Focal Deficits] : no focal deficits [Oriented To Time, Place, And Person] : oriented to person, place, and time [Impaired Insight] : insight and judgment were intact [Affect] : the affect was normal [Normal Rate] : the respiratory rate was normal [Rate ___] : at [unfilled] breaths per minute [Decreased Breath Sounds Bilaterally Bases] : breath sounds were diminished over both bases [Normal Oropharynx] : abnormal oropharynx [FreeTextEntry1] : obese

## 2024-02-08 NOTE — HISTORY OF PRESENT ILLNESS
[Snoring] : snoring [Frequent Nocturnal Awakening] : frequent nocturnal awakening [Daytime Somnolence] : daytime somnolence [Awakes Unrefreshed] : awakening unrefreshed [Date: ___] : Date of most recent diagnostic polysomnogram: [unfilled] [AHI: ___ per hour] : Apnea-hypopnea index:  [unfilled] per hour [T90%: ___] : T90%: [unfilled]% [Never] : never [FreeTextEntry1] : Hx asthma, hypoxia, GIOVANNI.  Last here 10/2022.   Was on advair after last visit here; given symbicort after hospital in January. Taking that.   Hx afib. Cardioversion 1/8/24 at ; Ablation for Afib 1/31/24. Seemd to have worked.  Has O2 at home prescribed May 2019. USing it to sleep and with some exertion.   Did not f/u on getting repeat CT.  Losing weight.   Went for sleep study in 10/2022; severe GIOVANNI; never came back here.

## 2024-02-08 NOTE — ASSESSMENT
[FreeTextEntry1] : Severe obstructive lung dz likely asthma with remodeling.   S/P AE asthma with flu. Hx of hypoxia. Cough also from signficant upper airway congestion, nasal drip. Morbid obesity.  TM seen on last CT.  Likely GIOVANNI.

## 2024-02-14 ENCOUNTER — NON-APPOINTMENT (OUTPATIENT)
Age: 69
End: 2024-02-14

## 2024-02-16 ENCOUNTER — APPOINTMENT (OUTPATIENT)
Dept: PULMONOLOGY | Facility: CLINIC | Age: 69
End: 2024-02-16
Payer: COMMERCIAL

## 2024-02-16 VITALS — WEIGHT: 314 LBS | BODY MASS INDEX: 49.28 KG/M2 | HEIGHT: 67 IN

## 2024-02-16 VITALS
HEART RATE: 77 BPM | DIASTOLIC BLOOD PRESSURE: 68 MMHG | RESPIRATION RATE: 16 BRPM | SYSTOLIC BLOOD PRESSURE: 138 MMHG | OXYGEN SATURATION: 93 %

## 2024-02-16 DIAGNOSIS — J44.9 CHRONIC OBSTRUCTIVE PULMONARY DISEASE, UNSPECIFIED: ICD-10-CM

## 2024-02-16 DIAGNOSIS — E66.01 MORBID (SEVERE) OBESITY DUE TO EXCESS CALORIES: ICD-10-CM

## 2024-02-16 DIAGNOSIS — G47.33 OBSTRUCTIVE SLEEP APNEA (ADULT) (PEDIATRIC): ICD-10-CM

## 2024-02-16 DIAGNOSIS — J96.91 RESPIRATORY FAILURE, UNSPECIFIED WITH HYPOXIA: ICD-10-CM

## 2024-02-16 DIAGNOSIS — J45.909 UNSPECIFIED ASTHMA, UNCOMPLICATED: ICD-10-CM

## 2024-02-16 PROCEDURE — 99214 OFFICE O/P EST MOD 30 MIN: CPT | Mod: 25

## 2024-02-16 PROCEDURE — 94010 BREATHING CAPACITY TEST: CPT

## 2024-02-16 NOTE — ASSESSMENT
[FreeTextEntry1] : Fixed severe airway obstruction. Never smoked. Long hx of asthma. Severe GIOVANNI. MO. No TBM seen on dynamic CT.

## 2024-02-16 NOTE — HISTORY OF PRESENT ILLNESS
[Snoring] : snoring [Frequent Nocturnal Awakening] : frequent nocturnal awakening [Daytime Somnolence] : daytime somnolence [Awakes Unrefreshed] : awakening unrefreshed [Date: ___] : Date of most recent diagnostic polysomnogram: [unfilled] [AHI: ___ per hour] : Apnea-hypopnea index:  [unfilled] per hour [T90%: ___] : T90%: [unfilled]% [Never] : never [FreeTextEntry1] : Hx asthma, hypoxia, GIOVANNI. With chronic airway obstruction.   Was on advair after last visit here; given symbicort after hospital in January 2023. Taking that now. Feeling well. Says his breathing is not an issue. No wheeze, cough.   Hx afib. Cardioversion 1/8/24 at ; Ablation for Afib 1/31/24. Seemd to have worked.  Has O2 at home prescribed May 2019. USing it to sleep and with some exertion.   Dynamic CT chest done 2/14/24; no TBM noted.  Losing weight.   Went for sleep study in 10/2022; severe GIOVANNI; CPAP study scheduled for 3/2/24.

## 2024-02-16 NOTE — PHYSICAL EXAM
[General Appearance - In No Acute Distress] : no acute distress [Normal Conjunctiva] : the conjunctiva exhibited no abnormalities [Low Lying Soft Palate] : low lying soft palate [Elongated Uvula] : elongated uvula [Enlarged Base of the Tongue] : enlargement of the base of the tongue [III] : III [Neck Appearance] : the appearance of the neck was normal [] : the neck was supple [Neck Circumference: ___] : neck circumference is [unfilled] [Heart Rate And Rhythm] : heart rate and rhythm were normal [Heart Sounds] : normal S1 and S2 [Bowel Sounds] : normal bowel sounds [Abdomen Soft] : soft [Nail Clubbing] : no clubbing of the fingernails [Cyanosis, Localized] : no localized cyanosis [No Focal Deficits] : no focal deficits [Oriented To Time, Place, And Person] : oriented to person, place, and time [Impaired Insight] : insight and judgment were intact [Affect] : the affect was normal [Normal Rate] : the respiratory rate was normal [Rate ___] : at [unfilled] breaths per minute [Decreased Breath Sounds Bilaterally Bases] : breath sounds were diminished over both bases [Normal Oropharynx] : abnormal oropharynx [FreeTextEntry1] : obese

## 2024-02-16 NOTE — PLAN
[TextEntry] : Continue symbicort.  Albuterol prn.  Weight loss a must.  Needs cpap; CPAP study as scheduled. R Reviewed with the patient the short and long term health consequences of untreated GIOVANNI. Risk include, but not limited to, HTN, heart disease, stroke, MVAs.

## 2024-02-16 NOTE — PROCEDURE
[FreeTextEntry1] : spirometry done today 2/16/24: severe obstruction with FEV1 38%; no significant change from 2019  spirometry done 6/27/19 reviewed; severe obstruction ----- DATE OF EXAM: 02/14/2024 R. PhysLeah Name: Shaka Olmstead Scott RLeah PhysLeah Address: [address] [place] [address], Suite [facility], [place], [facility] R. Phys. Phone: [phone] CT-CHEST NON CONTRAST  HISTORY: J45.998 Other asthma  TECHNIQUE: Noncontrast CT of the chest was performed. Axial, coronal and sagittal images were reconstructed using iterative reconstruction technique. As per request, dynamic imaging to include inspiratory and expiratory imaging was performed as per request. This study was performed using automatic exposure control (radiation dose reduction software) to obtain a diagnostic image quality scan with patient dose as low as reasonably achievable. The mA and kV were adjusted according to patient size. The administered radiation dose was 15.456 mSv.  Possible tracheomalacia. A nonsmoking history is provided.  COMPARISON: None  LUNGS AND AIRWAYS: There is no evidence of acute infiltrates or suspicious nodularity. Minor fibrotic/discoid atelectatic changes in the lungs are noted. There are no findings to suggest the presence of interstitial lung disease.  Examination of the trachea at the level of the thoracic inlet reveals a maximum AP measurement of 20 mm on inspiratory imaging. Expiratory imaging reveals a maximum AP measurement of 19 mm. More inferiorly at the level of the aortic arch there is a maximum AP measurement of 16 mm on both inspiratory and expiratory images. There is no evidence of bowing of the posterior aspect of the trachea on the expiratory images.  PLEURA: There is no evidence of a pleural effusion.  THYROID GLAND: There is no evidence of thyroid gland enlargement.  MEDIASTINUM, JENNIE: There is no evidence of lymphadenopathy.  HEART AND VESSELS: There is no evidence of aneurysmal dilatation of the thoracic aorta. Coronary artery calcifications are seen. There is no evidence of a pericardial effusion.  CHEST WALL/SOFT TISSUES/AXILLAE: There is no evidence of axillary lymphadenopathy.  BONES: There is no evidence of an aggressive osseous lesion.   UPPER ABDOMEN (partially imaged): Unremarkable   IMPRESSION:   There is no evidence of acute pulmonary disease.  There are no findings to suggest the presence of tracheomalacia.      Signed by: Rei Sanderson MD Signed Date: 2/15/2024 10:21 AM EST    SIGNED BY: Rei Sanderson M.D., Ext. [phone] 02/15/2024 10:21 AM

## 2024-02-16 NOTE — PROCEDURE
[FreeTextEntry1] : spirometry done today 2/16/24: severe obstruction with FEV1 38%; no significant change from 2019  spirometry done 6/27/19 reviewed; severe obstruction ----- DATE OF EXAM: 02/14/2024 R. PhysLeah Name: Shaka Olmstead Scott RLeah PhysLeah Address: [address] [place] [address], Suite [facility], [place], [facility] R. Phys. Phone: [phone] CT-CHEST NON CONTRAST  HISTORY: J45.998 Other asthma  TECHNIQUE: Noncontrast CT of the chest was performed. Axial, coronal and sagittal images were reconstructed using iterative reconstruction technique. As per request, dynamic imaging to include inspiratory and expiratory imaging was performed as per request. This study was performed using automatic exposure control (radiation dose reduction software) to obtain a diagnostic image quality scan with patient dose as low as reasonably achievable. The mA and kV were adjusted according to patient size. The administered radiation dose was 15.456 mSv.  Possible tracheomalacia. A nonsmoking history is provided.  COMPARISON: None  LUNGS AND AIRWAYS: There is no evidence of acute infiltrates or suspicious nodularity. Minor fibrotic/discoid atelectatic changes in the lungs are noted. There are no findings to suggest the presence of interstitial lung disease.  Examination of the trachea at the level of the thoracic inlet reveals a maximum AP measurement of 20 mm on inspiratory imaging. Expiratory imaging reveals a maximum AP measurement of 19 mm. More inferiorly at the level of the aortic arch there is a maximum AP measurement of 16 mm on both inspiratory and expiratory images. There is no evidence of bowing of the posterior aspect of the trachea on the expiratory images.  PLEURA: There is no evidence of a pleural effusion.  THYROID GLAND: There is no evidence of thyroid gland enlargement.  MEDIASTINUM, JENNIE: There is no evidence of lymphadenopathy.  HEART AND VESSELS: There is no evidence of aneurysmal dilatation of the thoracic aorta. Coronary artery calcifications are seen. There is no evidence of a pericardial effusion.  CHEST WALL/SOFT TISSUES/AXILLAE: There is no evidence of axillary lymphadenopathy.  BONES: There is no evidence of an aggressive osseous lesion.   UPPER ABDOMEN (partially imaged): Unremarkable   IMPRESSION:   There is no evidence of acute pulmonary disease.  There are no findings to suggest the presence of tracheomalacia.      Signed by: Rei Sanderson MD Signed Date: 2/15/2024 10:21 AM EST    SIGNED BY: Rei Sanderson M.D., Ext. [phone] 02/15/2024 10:21 AM

## 2024-03-02 ENCOUNTER — OUTPATIENT (OUTPATIENT)
Dept: OUTPATIENT SERVICES | Facility: HOSPITAL | Age: 69
LOS: 1 days | End: 2024-03-02
Payer: COMMERCIAL

## 2024-03-02 DIAGNOSIS — G47.33 OBSTRUCTIVE SLEEP APNEA (ADULT) (PEDIATRIC): ICD-10-CM

## 2024-03-02 PROCEDURE — 95811 POLYSOM 6/>YRS CPAP 4/> PARM: CPT | Mod: 26

## 2024-03-02 PROCEDURE — 95811 POLYSOM 6/>YRS CPAP 4/> PARM: CPT

## 2024-05-01 ENCOUNTER — RX RENEWAL (OUTPATIENT)
Age: 69
End: 2024-05-01

## 2024-05-01 RX ORDER — ALBUTEROL SULFATE 90 UG/1
108 (90 BASE) INHALANT RESPIRATORY (INHALATION)
Qty: 42.5 | Refills: 3 | Status: ACTIVE | COMMUNITY
Start: 2024-02-08 | End: 1900-01-01

## 2024-07-31 ENCOUNTER — APPOINTMENT (OUTPATIENT)
Dept: PULMONOLOGY | Facility: CLINIC | Age: 69
End: 2024-07-31
Payer: COMMERCIAL

## 2024-07-31 VITALS
WEIGHT: 291 LBS | DIASTOLIC BLOOD PRESSURE: 66 MMHG | SYSTOLIC BLOOD PRESSURE: 128 MMHG | RESPIRATION RATE: 16 BRPM | HEIGHT: 67 IN | BODY MASS INDEX: 45.67 KG/M2 | OXYGEN SATURATION: 94 % | HEART RATE: 68 BPM

## 2024-07-31 DIAGNOSIS — R06.09 OTHER FORMS OF DYSPNEA: ICD-10-CM

## 2024-07-31 DIAGNOSIS — G47.33 OBSTRUCTIVE SLEEP APNEA (ADULT) (PEDIATRIC): ICD-10-CM

## 2024-07-31 DIAGNOSIS — R06.2 WHEEZING: ICD-10-CM

## 2024-07-31 DIAGNOSIS — J96.91 RESPIRATORY FAILURE, UNSPECIFIED WITH HYPOXIA: ICD-10-CM

## 2024-07-31 DIAGNOSIS — J45.909 UNSPECIFIED ASTHMA, UNCOMPLICATED: ICD-10-CM

## 2024-07-31 DIAGNOSIS — E66.01 MORBID (SEVERE) OBESITY DUE TO EXCESS CALORIES: ICD-10-CM

## 2024-07-31 PROCEDURE — 99215 OFFICE O/P EST HI 40 MIN: CPT

## 2024-07-31 PROCEDURE — G2211 COMPLEX E/M VISIT ADD ON: CPT

## 2024-07-31 RX ORDER — SEMAGLUTIDE 1 MG/.5ML
1 INJECTION, SOLUTION SUBCUTANEOUS
Refills: 0 | Status: ACTIVE | COMMUNITY

## 2024-07-31 NOTE — HISTORY OF PRESENT ILLNESS
[Snoring] : snoring [Frequent Nocturnal Awakening] : frequent nocturnal awakening [Daytime Somnolence] : daytime somnolence [Awakes Unrefreshed] : awakening unrefreshed [AHI: ___ per hour] : Apnea-hypopnea index:  [unfilled] per hour [T90%: ___] : T90%: [unfilled]% [Date: ___] : the most recent therapeutic polysomnogram was completed [unfilled] [BPAP w/IPAP: ____ cmH2O] : BPAP with IPAP: [unfilled] cmH2O [BPAP w/EPAP: ___ cmH2O] : BPAP with EPAP: [unfilled] cmH2O [Nocturnal Oxygen] : The patient uses nocturnal oxygen [O2 Rate: ___] : O2 rate is [unfilled] lpm [Never] : never [% Days used > 4 hrs: ____] : Days used > 4 hrs: [unfilled] % [Therapy based AHI: ___ /hr] : Therapy based AHI: [unfilled] / hr [FreeTextEntry1] : Hx asthma, hypoxia, GIOVANNI. With chronic airway obstruction.     Feeling well. Says his breathing is not an issue. No wheeze, cough. On symbicort.   Hx afib. Cardioversion 1/8/24 at [facility]; Ablation for Afib 1/31/24.  S/P cath 7/24/24; no stents needed. Sees Dr Carcamo and Dr Simons.   Has O2 at home prescribed May 2019. USing it to sleep and with some exertion.   Dynamic CT chest done 2/14/24; no TBM noted.  Losing weight.   Went for sleep study in 10/2022; severe GIOVANNI; CPAP study done showing CPAP was not effective; needs BPAP with O2. Got BPAP at 19/12. Did not get hookup for O2. Doing well on BPAP otherwise. Compliance excellent. Therapeutic AHI WNL. Feels much better during the day; less EDS. No snoring also.

## 2024-07-31 NOTE — PLAN
[TextEntry] : Continue symbicort.  Albuterol prn.  Weight loss a must.  Continue BPAP; will order connection again for O2. Use 2 LPM with BPAP. Can reassess with further weight loss.  Reviewed with the patient the short and long term health consequences of untreated GIOVANNI. Risk include, but not limited to, HTN, heart disease, stroke, MVAs.

## 2024-07-31 NOTE — PROCEDURE
[FreeTextEntry1] : compliance reviewed  CPAP study reviewed  spirometry done  2/16/24: severe obstruction with FEV1 38%; no significant change from 2019  spirometry done 6/27/19 reviewed; severe obstruction ----- DATE OF EXAM: 02/14/2024 R. Phys. Name: Shaka Olmstead Scott R. Phys. Address: [address] [place] [address], [address] [place] [address], [facility] R. Phys. Phone: [phone] CT-CHEST NON CONTRAST  HISTORY: J45.998 Other asthma  TECHNIQUE: Noncontrast CT of the chest was performed. Axial, coronal and sagittal images were reconstructed using iterative reconstruction technique. As per request, dynamic imaging to include inspiratory and expiratory imaging was performed as per request. This study was performed using automatic exposure control (radiation dose reduction software) to obtain a diagnostic image quality scan with patient dose as low as reasonably achievable. The mA and kV were adjusted according to patient size. The administered radiation dose was 15.456 mSv.  Possible tracheomalacia. A nonsmoking history is provided.  COMPARISON: None  LUNGS AND AIRWAYS: There is no evidence of acute infiltrates or suspicious nodularity. Minor fibrotic/discoid atelectatic changes in the lungs are noted. There are no findings to suggest the presence of interstitial lung disease.  Examination of the trachea at the level of the thoracic inlet reveals a maximum AP measurement of 20 mm on inspiratory imaging. Expiratory imaging reveals a maximum AP measurement of 19 mm. More inferiorly at the level of the aortic arch there is a maximum AP measurement of 16 mm on both inspiratory and expiratory images. There is no evidence of bowing of the posterior aspect of the trachea on the expiratory images.  PLEURA: There is no evidence of a pleural effusion.  THYROID GLAND: There is no evidence of thyroid gland enlargement.  MEDIASTINUM, JENNIE: There is no evidence of lymphadenopathy.  HEART AND VESSELS: There is no evidence of aneurysmal dilatation of the thoracic aorta. Coronary artery calcifications are seen. There is no evidence of a pericardial effusion.  CHEST WALL/SOFT TISSUES/AXILLAE: There is no evidence of axillary lymphadenopathy.  BONES: There is no evidence of an aggressive osseous lesion.   UPPER ABDOMEN (partially imaged): Unremarkable   IMPRESSION:   There is no evidence of acute pulmonary disease.  There are no findings to suggest the presence of tracheomalacia.      Signed by: Rei Sanderson MD Signed Date: 2/15/2024 10:21 AM EST    SIGNED BY: Rei Sanderson M.D., Ext. [phone] 02/15/2024 10:21 AM

## 2024-12-31 ENCOUNTER — NON-APPOINTMENT (OUTPATIENT)
Age: 69
End: 2024-12-31

## 2025-03-26 ENCOUNTER — NON-APPOINTMENT (OUTPATIENT)
Age: 70
End: 2025-03-26

## 2025-04-28 ENCOUNTER — RX RENEWAL (OUTPATIENT)
Age: 70
End: 2025-04-28

## 2025-05-06 RX ORDER — ALBUTEROL SULFATE 90 UG/1
108 (90 BASE) POWDER, METERED RESPIRATORY (INHALATION) EVERY 4 HOURS
Qty: 3 | Refills: 3 | Status: DISCONTINUED | COMMUNITY
Start: 2025-04-30 | End: 2025-05-06

## 2025-05-07 RX ORDER — ALBUTEROL SULFATE 90 UG/1
108 (90 BASE) INHALANT RESPIRATORY (INHALATION)
Qty: 3 | Refills: 3 | Status: ACTIVE | COMMUNITY
Start: 2025-05-06 | End: 1900-01-01

## 2025-07-01 ENCOUNTER — OUTPATIENT (OUTPATIENT)
Dept: OUTPATIENT SERVICES | Facility: HOSPITAL | Age: 70
LOS: 1 days | End: 2025-07-01
Payer: COMMERCIAL

## 2025-07-01 ENCOUNTER — APPOINTMENT (OUTPATIENT)
Dept: PULMONOLOGY | Facility: CLINIC | Age: 70
End: 2025-07-01
Payer: COMMERCIAL

## 2025-07-01 ENCOUNTER — APPOINTMENT (OUTPATIENT)
Dept: RADIOLOGY | Facility: CLINIC | Age: 70
End: 2025-07-01
Payer: COMMERCIAL

## 2025-07-01 VITALS
SYSTOLIC BLOOD PRESSURE: 128 MMHG | RESPIRATION RATE: 16 BRPM | OXYGEN SATURATION: 93 % | WEIGHT: 270 LBS | HEIGHT: 66.5 IN | HEART RATE: 74 BPM | BODY MASS INDEX: 42.88 KG/M2 | DIASTOLIC BLOOD PRESSURE: 74 MMHG

## 2025-07-01 DIAGNOSIS — J15.9 UNSPECIFIED BACTERIAL PNEUMONIA: ICD-10-CM

## 2025-07-01 PROCEDURE — 71046 X-RAY EXAM CHEST 2 VIEWS: CPT

## 2025-07-01 PROCEDURE — 71046 X-RAY EXAM CHEST 2 VIEWS: CPT | Mod: 26

## 2025-07-01 PROCEDURE — 99215 OFFICE O/P EST HI 40 MIN: CPT

## 2025-07-01 PROCEDURE — G2211 COMPLEX E/M VISIT ADD ON: CPT | Mod: NC

## 2025-07-01 RX ORDER — SEMAGLUTIDE 1.7 MG/.75ML
INJECTION, SOLUTION SUBCUTANEOUS
Refills: 0 | Status: ACTIVE | COMMUNITY